# Patient Record
Sex: FEMALE | Race: WHITE | Employment: OTHER | URBAN - METROPOLITAN AREA
[De-identification: names, ages, dates, MRNs, and addresses within clinical notes are randomized per-mention and may not be internally consistent; named-entity substitution may affect disease eponyms.]

---

## 2019-06-03 ENCOUNTER — HOSPITAL ENCOUNTER (OUTPATIENT)
Age: 74
Discharge: HOME | End: 2019-06-05
Payer: MEDICARE

## 2019-06-03 PROCEDURE — 88175 CYTOPATH C/V AUTO FLUID REDO: CPT

## 2022-06-29 ENCOUNTER — APPOINTMENT (OUTPATIENT)
Dept: GENERAL RADIOLOGY | Age: 77
End: 2022-06-29
Payer: MEDICARE

## 2022-06-29 ENCOUNTER — HOSPITAL ENCOUNTER (EMERGENCY)
Age: 77
Discharge: HOME | End: 2022-06-29
Attending: EMERGENCY MEDICINE
Payer: MEDICARE

## 2022-06-29 VITALS
SYSTOLIC BLOOD PRESSURE: 138 MMHG | DIASTOLIC BLOOD PRESSURE: 78 MMHG | HEART RATE: 83 BPM | WEIGHT: 148 LBS | BODY MASS INDEX: 27.23 KG/M2 | HEIGHT: 62 IN | OXYGEN SATURATION: 96 % | TEMPERATURE: 97.6 F | RESPIRATION RATE: 14 BRPM

## 2022-06-29 DIAGNOSIS — U07.1 COVID-19: Primary | ICD-10-CM

## 2022-06-29 DIAGNOSIS — R06.02 SHORTNESS OF BREATH: ICD-10-CM

## 2022-06-29 LAB
ANION GAP SERPL CALCULATED.3IONS-SCNC: 14 MMOL/L (ref 7–16)
BASOPHILS ABSOLUTE: 0.02 E9/L (ref 0–0.2)
BASOPHILS RELATIVE PERCENT: 0.3 % (ref 0–2)
BUN BLDV-MCNC: 14 MG/DL (ref 6–23)
CALCIUM SERPL-MCNC: 9.5 MG/DL (ref 8.6–10.2)
CHLORIDE BLD-SCNC: 100 MMOL/L (ref 98–107)
CO2: 25 MMOL/L (ref 22–29)
CREAT SERPL-MCNC: 0.9 MG/DL (ref 0.5–1)
D DIMER: <200 NG/ML DDU
EKG ATRIAL RATE: 78 BPM
EKG P AXIS: 63 DEGREES
EKG P-R INTERVAL: 146 MS
EKG Q-T INTERVAL: 368 MS
EKG QRS DURATION: 82 MS
EKG QTC CALCULATION (BAZETT): 419 MS
EKG R AXIS: 0 DEGREES
EKG T AXIS: 59 DEGREES
EKG VENTRICULAR RATE: 78 BPM
EOSINOPHILS ABSOLUTE: 0.03 E9/L (ref 0.05–0.5)
EOSINOPHILS RELATIVE PERCENT: 0.5 % (ref 0–6)
GFR AFRICAN AMERICAN: >60
GFR NON-AFRICAN AMERICAN: >60 ML/MIN/1.73
GLUCOSE BLD-MCNC: 91 MG/DL (ref 74–99)
HCT VFR BLD CALC: 44.6 % (ref 34–48)
HEMOGLOBIN: 14.4 G/DL (ref 11.5–15.5)
IMMATURE GRANULOCYTES #: 0.02 E9/L
IMMATURE GRANULOCYTES %: 0.3 % (ref 0–5)
LYMPHOCYTES ABSOLUTE: 0.66 E9/L (ref 1.5–4)
LYMPHOCYTES RELATIVE PERCENT: 10.2 % (ref 20–42)
MCH RBC QN AUTO: 30.8 PG (ref 26–35)
MCHC RBC AUTO-ENTMCNC: 32.3 % (ref 32–34.5)
MCV RBC AUTO: 95.5 FL (ref 80–99.9)
MONOCYTES ABSOLUTE: 0.78 E9/L (ref 0.1–0.95)
MONOCYTES RELATIVE PERCENT: 12.1 % (ref 2–12)
NEUTROPHILS ABSOLUTE: 4.94 E9/L (ref 1.8–7.3)
NEUTROPHILS RELATIVE PERCENT: 76.6 % (ref 43–80)
PDW BLD-RTO: 13.3 FL (ref 11.5–15)
PLATELET # BLD: 286 E9/L (ref 130–450)
PMV BLD AUTO: 10.3 FL (ref 7–12)
POTASSIUM REFLEX MAGNESIUM: 4.1 MMOL/L (ref 3.5–5)
RBC # BLD: 4.67 E12/L (ref 3.5–5.5)
SODIUM BLD-SCNC: 139 MMOL/L (ref 132–146)
TROPONIN, HIGH SENSITIVITY: 7 NG/L (ref 0–9)
TROPONIN, HIGH SENSITIVITY: 8 NG/L (ref 0–9)
WBC # BLD: 6.5 E9/L (ref 4.5–11.5)

## 2022-06-29 PROCEDURE — 85025 COMPLETE CBC W/AUTO DIFF WBC: CPT

## 2022-06-29 PROCEDURE — 80048 BASIC METABOLIC PNL TOTAL CA: CPT

## 2022-06-29 PROCEDURE — 99285 EMERGENCY DEPT VISIT HI MDM: CPT

## 2022-06-29 PROCEDURE — 85378 FIBRIN DEGRADE SEMIQUANT: CPT

## 2022-06-29 PROCEDURE — 84484 ASSAY OF TROPONIN QUANT: CPT

## 2022-06-29 PROCEDURE — 71046 X-RAY EXAM CHEST 2 VIEWS: CPT

## 2022-06-29 PROCEDURE — 93005 ELECTROCARDIOGRAM TRACING: CPT | Performed by: NURSE PRACTITIONER

## 2022-06-29 NOTE — ED PROVIDER NOTES
ED Attending shared visit  CC: No         [facility]  Department of Emergency Medicine   ED  Encounter Note  Admit Date/RoomTime: 2022  2:13 PM  ED Room:     NAME: Marques Leo  : 1945  MRN: [id number]     Chief Complaint:  Positive For Covid-19 (today), Chest Pain (SOB and CP x3 days), and Shortness of Breath    History of Present Illness      Marques Leo is a 68 y.o. old female who presents to the emergency department by private vehicle with sudden onset of SOB with exertion which began 3 day(s) prior to arrival.   Her symptoms are associated with cough and chest pain, and denies abdominal pain, AICD firing, calf pain, dizziness, leg swelling, palpitations, rapid heart beat, slow heart beat or syncope. Since onset the symptoms have been remaining constant. The symptoms are aggravated by nothing and relieved by nothing. Patient states she tested positive for COVID-19 at home test today. .  ROS   Pertinent positives and negatives are stated within HPI, all other systems reviewed and are negative. Past Medical History:  has a past medical history of Hypertension and Varicose veins. Surgical History:  has a past surgical history that includes [place] tooth extraction; Colonoscopy; and other surgical history (Right, 5/15/15). Social History:  reports that she has never smoked. She has never used smokeless tobacco. She reports current alcohol use. She reports that she does not use drugs. Family History: family history includes Cancer in her maternal cousin; High Blood Pressure in her father and mother. Allergies: Patient has no known allergies. Physical Exam   Oxygen Saturation Interpretation: Normal on room air analysis.         ED Triage Vitals [22 1324]   BP Temp Temp Source Heart Rate Resp SpO2 Height Weight   (!) 148/104 97.6 °F (36.4 °C) Temporal 83 14 99 % 5' 2\" (1.575 m) 148 lb (67.1 kg)         · General Appearance/Constitutional: Alert,.  · HEENT:  NC/NT. PERRLA. Airway patent. · Neck:  Normal ROM. Supple. · Respiratoty:  Breath sounds: equal bilaterally. Lung sounds: normal.   · CV:  Regular rate and rhythm, normal heart sounds, without pathological murmurs, ectopy, gallops, or rubs. .  · GI:  Soft, nontender, good bowel sounds. No firm or pulsatile mass. · Integument:  Normal turgor. Warm, dry, without visible rash. · Extremities/Lymphatics:  Edema:  none Bilateral lower extremity(s). No cords or calf tenderness. No significant calf/ankle edema. .  · Neurological:  Oriented x3. Motor functions intact.     Lab / Imaging Results   (All laboratory and radiology results have been personally reviewed by myself)  Labs:  Results for orders placed or performed during the hospital encounter of 06/29/22   CBC with Auto Differential   Result Value Ref Range    WBC 6.5 4.5 - 11.5 E9/L    RBC 4.67 3.50 - 5.50 E12/L    Hemoglobin 14.4 11.5 - 15.5 g/dL    Hematocrit 44.6 34.0 - 48.0 %    MCV 95.5 80.0 - 99.9 fL    MCH 30.8 26.0 - 35.0 pg    MCHC 32.3 32.0 - 34.5 %    RDW 13.3 11.5 - 15.0 fL    Platelets 614 613 - 097 E9/L    MPV 10.3 7.0 - 12.0 fL    Neutrophils % 76.6 43.0 - 80.0 %    Immature Granulocytes % 0.3 0.0 - 5.0 %    Lymphocytes % 10.2 (L) 20.0 - 42.0 %    Monocytes % 12.1 (H) 2.0 - 12.0 %    Eosinophils % 0.5 0.0 - 6.0 %    Basophils % 0.3 0.0 - 2.0 %    Neutrophils Absolute 4.94 1.80 - 7.30 E9/L    Immature Granulocytes # 0.02 E9/L    Lymphocytes Absolute 0.66 (L) 1.50 - 4.00 E9/L    Monocytes Absolute 0.78 0.10 - 0.95 E9/L    Eosinophils Absolute 0.03 (L) 0.05 - 0.50 E9/L    Basophils Absolute 0.02 0.00 - 0.20 J3/P   Basic Metabolic Panel w/ Reflex to MG   Result Value Ref Range    Sodium 139 132 - 146 mmol/L    Potassium reflex Magnesium 4.1 3.5 - 5.0 mmol/L    Chloride 100 98 - 107 mmol/L    CO2 25 22 - 29 mmol/L    Anion Gap 14 7 - 16 mmol/L    Glucose 91 74 - 99 mg/dL    BUN 14 6 - 23 mg/dL    CREATININE 0.9 0.5 - 1.0 mg/dL GFR Non-African American >60 >=60 mL/min/1.73    GFR African American >60     Calcium 9.5 8.6 - 10.2 mg/dL   Troponin   Result Value Ref Range    Troponin, High Sensitivity 8 0 - 9 ng/L   D-Dimer, Quantitative   Result Value Ref Range    D-Dimer, Quant <200 ng/mL DDU   Troponin   Result Value Ref Range    Troponin, High Sensitivity 7 0 - 9 ng/L   EKG 12 Lead   Result Value Ref Range    Ventricular Rate 78 BPM    Atrial Rate 78 BPM    P-R Interval 146 ms    QRS Duration 82 ms    Q-T Interval 368 ms    QTc Calculation (Bazett) 419 ms    P Axis 63 degrees    R Axis 0 degrees    T Axis 59 degrees     Imaging: All Radiology results interpreted by Radiologist unless otherwise noted. XR CHEST (2 VW)   Final Result   No acute process. EKG #1:  Interpreted by emergency department attending physician unless otherwise noted. 22  Time: 1323  Rate: 78  Rhythm: Sinus  Interpretation: Normal sinus rhythm with ST and T wave abnormality. No STEMI. Comparison: no previous EKG available           HISTORY:0  EC  AGE:2  RISK FACTORS:1  TROPONIN: 0    TOTAL SCORE: 3    ED Course / Medical Decision Making   Medications - No data to display     Re-Evaluations:  22      Time: 1546-RN ambulated patient and she remained at 96% no distress noted. Patients condition is improving. Consultations:             None    Procedures:   none    MDM:  Patient presents to the ED for shortness of breath and chest pain after testing positive for COVID 19. Differential diagnoses included but not limited to cardiac versus PE versus COVID 19 versus hypoxia. Workup in the ED revealed patient's vital signs are stable patient not hypoxic or tachycardic. She ambulated with a steady gait without being hypoxic. High-sensitivity delta delta troponins were 8 and 7. CBC and BMP was unremarkable. D-dimer was less than 200 EKG was normal sinus rhythm with no STEMI. Heart score is a 3 based on age and risk factors.   Chest x-ray reveals no acute processes. She will be discharged home with a pulse ox to monitor at home. Lung sounds are clear. Patient continues to be non-toxic on re-evaluation. Findings were discussed with the patient and reasons to immediately return to the ED were articulated to them. They will follow-up with their PMD.      Plan of Care/Counseling:  MIGUEL Grimm CNP and EM Attending Physician reviewed today's visit with the patient in addition to providing specific details for the plan of care and counseling regarding the diagnosis and prognosis. Questions are answered at this time and are agreeable with the plan. Assessment      1. COVID-19    2. Shortness of breath      This patient's ED course included: a personal history and physicial examination, re-evaluation prior to disposition, multiple bedside re-evaluations, IV medications and cardiac monitoring  This patient has remained hemodynamically stable during their ED course. Plan   Discharged home. Patient condition is stable. New Medications     Discharge Medication List as of 6/29/2022  4:44 PM        Electronically signed by MIGUEL Grimm CNP   DD: 6/29/22  **This report was transcribed using voice recognition software. Every effort was made to ensure accuracy; however, inadvertent computerized transcription errors may be present.   END OF PROVIDER NOTE        MIGUEL Grimm CNP  06/29/22 [id number]

## 2022-06-29 NOTE — ED NOTES
Department of Emergency Medicine  FIRST PROVIDER TRIAGE NOTE             Independent NATHAN           6/29/22  1:25 PM EDT    Date of Encounter: 6/29/22   MRN: [id number]      HPI: James Medrano is a 68 y.o. female who presents to the ED for Positive For Covid-19 (today), Chest Pain (SOB and CP x3 days), and Shortness of Breath     Left-sided chest pain that goes into her back for the last 3 days. Along with shortness of breath. She tested at home test for COVID-19 was positive today. Vitals:    06/29/22 1324   BP: (!) 148/104   Pulse: 83   Resp: 14   Temp: 97.6 °F (36.4 °C)   SpO2: 99%       ROS: Negative for abd pain or fever. PE: Gen Appearance/Constitutional: alert  CV: regular rate     Initial Plan of Care: All treatment areas with department are currently occupied. Plan to order/Initiate the following while awaiting opening in ED: labs, EKG and imaging studies.   Initiate Treatment-Testing, Proceed toTreatment Area When Bed Available for ED Attending/NATHAN to Continue Care    Electronically signed by MIGUEL Cosby CNP   DD: 6/29/22         MIGUEL Cosby CNP  06/29/22 [id number]

## 2022-06-29 NOTE — ED NOTES
Ambulated patient approx 150 ft while pt maintained 96% on room air.       Ginny Hernandez RN  06/29/22 [id number]

## 2022-06-30 ENCOUNTER — CARE COORDINATION (OUTPATIENT)
Dept: CARE COORDINATION | Age: 77
End: 2022-06-30

## 2022-06-30 NOTE — CARE COORDINATION
Patient contacted regarding COVID-19 diagnosis and pulse oximeter ordered at discharge. Discussed COVID-19 related testing which was available at this time. Test results were positive. Patient informed of results, if available? Yes. Ambulatory Care Manager contacted the patient by telephone to perform post discharge assessment. Call within 2 business days of discharge: Yes. Verified name and  with patient as identifiers. Provided introduction to self, and explanation of the CTN/ACM role, and reason for call due to risk factors for infection and/or exposure to COVID-19. Symptoms reviewed with patient who verbalized the following symptoms: cough  shortness of breath  no new symptoms  no worsening symptoms. Due to no new or worsening symptoms encounter was not routed to provider for escalation. Discussed follow-up appointments. If no appointment was previously scheduled, appointment scheduling offered: Yes. [facility] follow up appointment(s): No future appointments. SPOKE WITH OFFICE, NO NEED TO SEE PT AT THIS TIME PER OFFICE  [facility] follow up appointment(s)NA    Non-face-to-face services provided:  Obtained and reviewed discharge summary and/or continuity of care documents     Advance Care Planning:   Does patient have an Advance Directive:  reviewed and current. Educated patient about risk for severe COVID-19 due to risk factors according to CDC guidelines. ACM reviewed discharge instructions, medical action plan and red flag symptoms with the patient who verbalized understanding. Discussed COVID vaccination status: Yes. Education provided on COVID-19 vaccination as appropriate. Discussed exposure protocols and quarantine with CDC Guidelines. Patient was given an opportunity to verbalize any questions and concerns and agrees to contact ACM or health care provider for questions related to their healthcare.     Reviewed and educated patient on any new and changed medications related to discharge diagnosis     Was patient discharged with a pulse oximeter? yes, discussed and confirmed pulse oximeter discharge instructions and when to notify provider or seek emergency care. ACM provided contact information. No further follow-up call identified based on severity of symptoms and risk factors. Patient understands CDC guidelines and is able to repeat them. Patient verbalizes understanding of suggestions for follow up from ED AVS and has number to do so  Patient has no new or worsening symptoms. Patient wishes [facility]. Patient has contact information and encouraged to contact ACM should there be any questions or concerns. Episode to be closed at this time.    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

## 2025-03-06 ENCOUNTER — TELEPHONE (OUTPATIENT)
Dept: VASCULAR SURGERY | Age: 80
End: 2025-03-06

## 2025-03-06 NOTE — TELEPHONE ENCOUNTER
Received referral from [facility] for Dr. Hamm regarding varicose veins, left message for patient to return call to schedule.

## 2025-04-01 ENCOUNTER — OFFICE VISIT (OUTPATIENT)
Dept: VASCULAR SURGERY | Age: 80
End: 2025-04-01

## 2025-04-01 DIAGNOSIS — M79.605 LEFT LEG PAIN: Primary | ICD-10-CM

## 2025-04-01 RX ORDER — MONTELUKAST SODIUM 10 MG/1
10 TABLET ORAL NIGHTLY
COMMUNITY

## 2025-04-01 RX ORDER — LEVOTHYROXINE SODIUM 25 UG/1
25 TABLET ORAL DAILY
COMMUNITY

## 2025-04-01 NOTE — PROGRESS NOTES
Vascular Surgery Outpatient Consultation      Chief Complaint   Patient presents with    Surgical Consult     VV       Reason for Consult:  Left leg pain    Requesting Physician:  Dr. Wan    HISTORY OF PRESENT ILLNESS:                The patient is a 79 y.o. female who is referred for evaluation of left leg pain.  Pt is known to us from 2015 when she had right leg stab phlebectomies done.  Since that time her varicose veins have returned but they do not bother her.  Pt states she is very active and on her feet all day.  She does have known issues with her back and sciatica.  About 3 weeks ago she developed a sharp pain that started in the left foot and traveled up to the thigh.  This lasted all day. When she woke up the next day the pain was gone.  She has not had the pain since.  She is traveling soon and wants to make sure there is not an issue with her leg.     Past Medical History:        Diagnosis Date    Acute H. pylori gastric ulcer     Hypertension     Varicose veins      Past Surgical History:        Procedure Laterality Date    COLONOSCOPY      OTHER SURGICAL HISTORY Right 5/15/15    lower extremity stab phlebectomies    WISDOM TOOTH EXTRACTION       Current Medications:   Prior to Admission medications    Medication Sig Start Date End Date Taking? Authorizing Provider   levothyroxine (SYNTHROID) 25 MCG tablet Take 1 tablet by mouth daily   Yes Demarcus Griffith MD   AMLODIPINE BESYLATE PO    Yes Demarcus Griffith MD   montelukast (SINGULAIR) 10 MG tablet Take 1 tablet by mouth nightly   Yes Demarcus Griffith MD   hydrochlorothiazide (MICROZIDE) 12.5 MG capsule  1/9/17  Yes Demarcus Griffith MD   venlafaxine (EFFEXOR XR) 37.5 MG extended release capsule  3/17/17  Yes Demarcus Griffith MD   Naproxen Sodium (ALEVE PO) Take  by mouth.  Patient not taking: Reported on 4/1/2025    Demarcus Griffith MD     Allergies:  Patient has no known allergies.    Social History     Socioeconomic

## 2025-07-13 ENCOUNTER — HOSPITAL ENCOUNTER (INPATIENT)
Age: 80
LOS: 4 days | Discharge: HOME HEALTH | DRG: 871 | End: 2025-07-17
Attending: STUDENT IN AN ORGANIZED HEALTH CARE EDUCATION/TRAINING PROGRAM | Admitting: STUDENT IN AN ORGANIZED HEALTH CARE EDUCATION/TRAINING PROGRAM
Payer: MEDICARE

## 2025-07-13 ENCOUNTER — APPOINTMENT (OUTPATIENT)
Dept: CT IMAGING | Age: 80
DRG: 871 | End: 2025-07-13
Attending: STUDENT IN AN ORGANIZED HEALTH CARE EDUCATION/TRAINING PROGRAM
Payer: MEDICARE

## 2025-07-13 ENCOUNTER — APPOINTMENT (OUTPATIENT)
Dept: ULTRASOUND IMAGING | Age: 80
DRG: 871 | End: 2025-07-13
Payer: MEDICARE

## 2025-07-13 DIAGNOSIS — R42 DIZZINESS: ICD-10-CM

## 2025-07-13 DIAGNOSIS — J18.9 PNEUMONIA OF RIGHT LOWER LOBE DUE TO INFECTIOUS ORGANISM: ICD-10-CM

## 2025-07-13 DIAGNOSIS — E87.1 HYPONATREMIA: Primary | ICD-10-CM

## 2025-07-13 DIAGNOSIS — I26.93 SINGLE SUBSEGMENTAL PULMONARY EMBOLISM WITHOUT ACUTE COR PULMONALE (HCC): ICD-10-CM

## 2025-07-13 LAB
ALBUMIN SERPL-MCNC: 3.4 G/DL (ref 3.5–5.2)
ALP SERPL-CCNC: 85 U/L (ref 35–104)
ALT SERPL-CCNC: 89 U/L (ref 0–35)
ANION GAP SERPL CALCULATED.3IONS-SCNC: 12 MMOL/L (ref 7–16)
ANION GAP SERPL CALCULATED.3IONS-SCNC: 17 MMOL/L (ref 7–16)
AST SERPL-CCNC: 89 U/L (ref 0–35)
B PARAP IS1001 DNA NPH QL NAA+NON-PROBE: NOT DETECTED
B PERT DNA SPEC QL NAA+PROBE: NOT DETECTED
BACTERIA URNS QL MICRO: ABNORMAL
BASOPHILS # BLD: 0.13 K/UL (ref 0–0.2)
BASOPHILS NFR BLD: 1 % (ref 0–2)
BILIRUB SERPL-MCNC: 0.3 MG/DL (ref 0–1.2)
BILIRUB UR QL STRIP: ABNORMAL
BUN SERPL-MCNC: 15 MG/DL (ref 8–23)
BUN SERPL-MCNC: 16 MG/DL (ref 8–23)
C PNEUM DNA NPH QL NAA+NON-PROBE: NOT DETECTED
CALCIUM SERPL-MCNC: 8.5 MG/DL (ref 8.8–10.2)
CALCIUM SERPL-MCNC: 8.6 MG/DL (ref 8.8–10.2)
CHLORIDE SERPL-SCNC: 88 MMOL/L (ref 98–107)
CHLORIDE SERPL-SCNC: 89 MMOL/L (ref 98–107)
CHOLEST SERPL-MCNC: 143 MG/DL
CLARITY UR: ABNORMAL
CO2 SERPL-SCNC: 20 MMOL/L (ref 22–29)
CO2 SERPL-SCNC: 25 MMOL/L (ref 22–29)
COLOR UR: YELLOW
CREAT SERPL-MCNC: 0.9 MG/DL (ref 0.5–1)
CREAT SERPL-MCNC: 1.1 MG/DL (ref 0.5–1)
CREAT UR-MCNC: 288 MG/DL (ref 29–226)
CRP SERPL HS-MCNC: 325 MG/L (ref 0–5)
EOSINOPHIL # BLD: 0 K/UL (ref 0.05–0.5)
EOSINOPHILS RELATIVE PERCENT: 0 % (ref 0–6)
EPI CELLS #/AREA URNS HPF: ABNORMAL /HPF
ERYTHROCYTE [DISTWIDTH] IN BLOOD BY AUTOMATED COUNT: 12.9 % (ref 11.5–15)
FLUAV RNA NPH QL NAA+NON-PROBE: NOT DETECTED
FLUBV RNA NPH QL NAA+NON-PROBE: NOT DETECTED
GFR, ESTIMATED: 53 ML/MIN/1.73M2
GFR, ESTIMATED: 64 ML/MIN/1.73M2
GLUCOSE SERPL-MCNC: 101 MG/DL (ref 74–99)
GLUCOSE SERPL-MCNC: 90 MG/DL (ref 74–99)
GLUCOSE UR STRIP-MCNC: NEGATIVE MG/DL
HADV DNA NPH QL NAA+NON-PROBE: NOT DETECTED
HCOV 229E RNA NPH QL NAA+NON-PROBE: NOT DETECTED
HCOV HKU1 RNA NPH QL NAA+NON-PROBE: NOT DETECTED
HCOV NL63 RNA NPH QL NAA+NON-PROBE: NOT DETECTED
HCOV OC43 RNA NPH QL NAA+NON-PROBE: NOT DETECTED
HCT VFR BLD AUTO: 36.3 % (ref 34–48)
HDLC SERPL-MCNC: 44 MG/DL
HGB BLD-MCNC: 12.2 G/DL (ref 11.5–15.5)
HGB UR QL STRIP.AUTO: ABNORMAL
HMPV RNA NPH QL NAA+NON-PROBE: NOT DETECTED
HPIV1 RNA NPH QL NAA+NON-PROBE: NOT DETECTED
HPIV2 RNA NPH QL NAA+NON-PROBE: NOT DETECTED
HPIV3 RNA NPH QL NAA+NON-PROBE: NOT DETECTED
HPIV4 RNA NPH QL NAA+NON-PROBE: NOT DETECTED
KETONES UR STRIP-MCNC: ABNORMAL MG/DL
LDLC SERPL CALC-MCNC: 80 MG/DL
LEUKOCYTE ESTERASE UR QL STRIP: ABNORMAL
LYMPHOCYTES NFR BLD: 0.53 K/UL (ref 1.5–4)
LYMPHOCYTES RELATIVE PERCENT: 4 % (ref 20–42)
M PNEUMO DNA NPH QL NAA+NON-PROBE: NOT DETECTED
MAGNESIUM SERPL-MCNC: 2.2 MG/DL (ref 1.6–2.4)
MCH RBC QN AUTO: 30.2 PG (ref 26–35)
MCHC RBC AUTO-ENTMCNC: 33.6 G/DL (ref 32–34.5)
MCV RBC AUTO: 89.9 FL (ref 80–99.9)
MONOCYTES NFR BLD: 16 % (ref 2–12)
MONOCYTES NFR BLD: 2.38 K/UL (ref 0.1–0.95)
NEUTROPHILS NFR BLD: 80 % (ref 43–80)
NEUTS SEG NFR BLD: 12.16 K/UL (ref 1.8–7.3)
NITRITE UR QL STRIP: NEGATIVE
OSMOLALITY UR: 548 MOSM/KG (ref 300–900)
PH UR STRIP: 6 [PH] (ref 5–8)
PLATELET # BLD AUTO: 279 K/UL (ref 130–450)
PMV BLD AUTO: 10.9 FL (ref 7–12)
POTASSIUM SERPL-SCNC: 4.1 MMOL/L (ref 3.5–5.1)
POTASSIUM SERPL-SCNC: 4.1 MMOL/L (ref 3.5–5.1)
PROCALCITONIN SERPL-MCNC: 1.47 NG/ML (ref 0–0.08)
PROT SERPL-MCNC: 6.8 G/DL (ref 6.4–8.3)
PROT UR STRIP-MCNC: 100 MG/DL
RBC # BLD AUTO: 4.04 M/UL (ref 3.5–5.5)
RBC # BLD: ABNORMAL 10*6/UL
RBC # BLD: ABNORMAL 10*6/UL
RBC #/AREA URNS HPF: ABNORMAL /HPF
RSV RNA NPH QL NAA+NON-PROBE: NOT DETECTED
RV+EV RNA NPH QL NAA+NON-PROBE: NOT DETECTED
SARS-COV-2 RNA NPH QL NAA+NON-PROBE: NOT DETECTED
SODIUM SERPL-SCNC: 125 MMOL/L (ref 136–145)
SODIUM SERPL-SCNC: 126 MMOL/L (ref 136–145)
SODIUM UR-SCNC: <20 MMOL/L
SP GR UR STRIP: 1.02 (ref 1–1.03)
SPECIMEN DESCRIPTION: NORMAL
T4 FREE SERPL-MCNC: 1 NG/DL (ref 0.9–1.7)
TOTAL PROTEIN, URINE: 145 MG/DL (ref 0–12)
TRIGL SERPL-MCNC: 99 MG/DL
TROPONIN I SERPL HS-MCNC: 17 NG/L (ref 0–14)
TSH SERPL DL<=0.05 MIU/L-ACNC: 1.22 UIU/ML (ref 0.27–4.2)
URATE SERPL-MCNC: 5.1 MG/DL (ref 2.4–5.7)
URINE TOTAL PROTEIN CREATININE RATIO: 0.5 (ref 0–0.2)
UROBILINOGEN UR STRIP-ACNC: 1 EU/DL (ref 0–1)
UUN UR-MCNC: 887 MG/DL (ref 800–1666)
VLDLC SERPL CALC-MCNC: 20 MG/DL
WBC #/AREA URNS HPF: ABNORMAL /HPF
WBC OTHER # BLD: 15.2 K/UL (ref 4.5–11.5)

## 2025-07-13 PROCEDURE — 2580000003 HC RX 258: Performed by: STUDENT IN AN ORGANIZED HEALTH CARE EDUCATION/TRAINING PROGRAM

## 2025-07-13 PROCEDURE — 80061 LIPID PANEL: CPT

## 2025-07-13 PROCEDURE — 83935 ASSAY OF URINE OSMOLALITY: CPT

## 2025-07-13 PROCEDURE — 93970 EXTREMITY STUDY: CPT

## 2025-07-13 PROCEDURE — 84145 PROCALCITONIN (PCT): CPT

## 2025-07-13 PROCEDURE — 84443 ASSAY THYROID STIM HORMONE: CPT

## 2025-07-13 PROCEDURE — 85025 COMPLETE CBC W/AUTO DIFF WBC: CPT

## 2025-07-13 PROCEDURE — 80048 BASIC METABOLIC PNL TOTAL CA: CPT

## 2025-07-13 PROCEDURE — 87086 URINE CULTURE/COLONY COUNT: CPT

## 2025-07-13 PROCEDURE — 2500000003 HC RX 250 WO HCPCS: Performed by: STUDENT IN AN ORGANIZED HEALTH CARE EDUCATION/TRAINING PROGRAM

## 2025-07-13 PROCEDURE — 84300 ASSAY OF URINE SODIUM: CPT

## 2025-07-13 PROCEDURE — 80053 COMPREHEN METABOLIC PANEL: CPT

## 2025-07-13 PROCEDURE — 0202U NFCT DS 22 TRGT SARS-COV-2: CPT

## 2025-07-13 PROCEDURE — 87899 AGENT NOS ASSAY W/OPTIC: CPT

## 2025-07-13 PROCEDURE — 72125 CT NECK SPINE W/O DYE: CPT

## 2025-07-13 PROCEDURE — 96374 THER/PROPH/DIAG INJ IV PUSH: CPT

## 2025-07-13 PROCEDURE — 87449 NOS EACH ORGANISM AG IA: CPT

## 2025-07-13 PROCEDURE — 99285 EMERGENCY DEPT VISIT HI MDM: CPT

## 2025-07-13 PROCEDURE — 71275 CT ANGIOGRAPHY CHEST: CPT

## 2025-07-13 PROCEDURE — 87081 CULTURE SCREEN ONLY: CPT

## 2025-07-13 PROCEDURE — 86140 C-REACTIVE PROTEIN: CPT

## 2025-07-13 PROCEDURE — 99222 1ST HOSP IP/OBS MODERATE 55: CPT | Performed by: STUDENT IN AN ORGANIZED HEALTH CARE EDUCATION/TRAINING PROGRAM

## 2025-07-13 PROCEDURE — 6360000004 HC RX CONTRAST MEDICATION: Performed by: SPECIALIST

## 2025-07-13 PROCEDURE — 70450 CT HEAD/BRAIN W/O DYE: CPT

## 2025-07-13 PROCEDURE — 84550 ASSAY OF BLOOD/URIC ACID: CPT

## 2025-07-13 PROCEDURE — 84439 ASSAY OF FREE THYROXINE: CPT

## 2025-07-13 PROCEDURE — 84484 ASSAY OF TROPONIN QUANT: CPT

## 2025-07-13 PROCEDURE — 93005 ELECTROCARDIOGRAM TRACING: CPT | Performed by: STUDENT IN AN ORGANIZED HEALTH CARE EDUCATION/TRAINING PROGRAM

## 2025-07-13 PROCEDURE — 2140000000 HC CCU INTERMEDIATE R&B

## 2025-07-13 PROCEDURE — 84540 ASSAY OF URINE/UREA-N: CPT

## 2025-07-13 PROCEDURE — 82570 ASSAY OF URINE CREATININE: CPT

## 2025-07-13 PROCEDURE — 6360000002 HC RX W HCPCS: Performed by: STUDENT IN AN ORGANIZED HEALTH CARE EDUCATION/TRAINING PROGRAM

## 2025-07-13 PROCEDURE — 81001 URINALYSIS AUTO W/SCOPE: CPT

## 2025-07-13 PROCEDURE — 84156 ASSAY OF PROTEIN URINE: CPT

## 2025-07-13 PROCEDURE — 83735 ASSAY OF MAGNESIUM: CPT

## 2025-07-13 RX ORDER — VENLAFAXINE HYDROCHLORIDE 75 MG/1
75 CAPSULE, EXTENDED RELEASE ORAL DAILY
Status: DISCONTINUED | OUTPATIENT
Start: 2025-07-14 | End: 2025-07-17 | Stop reason: HOSPADM

## 2025-07-13 RX ORDER — SODIUM CHLORIDE 0.9 % (FLUSH) 0.9 %
5-40 SYRINGE (ML) INJECTION EVERY 12 HOURS SCHEDULED
Status: DISCONTINUED | OUTPATIENT
Start: 2025-07-13 | End: 2025-07-17 | Stop reason: HOSPADM

## 2025-07-13 RX ORDER — ONDANSETRON 2 MG/ML
4 INJECTION INTRAMUSCULAR; INTRAVENOUS EVERY 6 HOURS PRN
Status: DISCONTINUED | OUTPATIENT
Start: 2025-07-13 | End: 2025-07-17 | Stop reason: HOSPADM

## 2025-07-13 RX ORDER — SODIUM CHLORIDE 0.9 % (FLUSH) 0.9 %
5-40 SYRINGE (ML) INJECTION PRN
Status: DISCONTINUED | OUTPATIENT
Start: 2025-07-13 | End: 2025-07-17 | Stop reason: HOSPADM

## 2025-07-13 RX ORDER — SODIUM CHLORIDE 9 MG/ML
INJECTION, SOLUTION INTRAVENOUS CONTINUOUS
Status: ACTIVE | OUTPATIENT
Start: 2025-07-13 | End: 2025-07-14

## 2025-07-13 RX ORDER — SODIUM CHLORIDE 9 MG/ML
INJECTION, SOLUTION INTRAVENOUS PRN
Status: DISCONTINUED | OUTPATIENT
Start: 2025-07-13 | End: 2025-07-17 | Stop reason: HOSPADM

## 2025-07-13 RX ORDER — VENLAFAXINE 75 MG/1
75 TABLET ORAL DAILY
COMMUNITY

## 2025-07-13 RX ORDER — ACETAMINOPHEN 650 MG/1
650 SUPPOSITORY RECTAL EVERY 6 HOURS PRN
Status: DISCONTINUED | OUTPATIENT
Start: 2025-07-13 | End: 2025-07-17 | Stop reason: HOSPADM

## 2025-07-13 RX ORDER — ONDANSETRON 4 MG/1
4 TABLET, ORALLY DISINTEGRATING ORAL EVERY 8 HOURS PRN
Status: DISCONTINUED | OUTPATIENT
Start: 2025-07-13 | End: 2025-07-17 | Stop reason: HOSPADM

## 2025-07-13 RX ORDER — ENOXAPARIN SODIUM 100 MG/ML
40 INJECTION SUBCUTANEOUS DAILY
Status: DISCONTINUED | OUTPATIENT
Start: 2025-07-13 | End: 2025-07-13

## 2025-07-13 RX ORDER — VENLAFAXINE HYDROCHLORIDE 37.5 MG/1
37.5 CAPSULE, EXTENDED RELEASE ORAL
Status: DISCONTINUED | OUTPATIENT
Start: 2025-07-14 | End: 2025-07-14

## 2025-07-13 RX ORDER — IOPAMIDOL 755 MG/ML
75 INJECTION, SOLUTION INTRAVASCULAR
Status: COMPLETED | OUTPATIENT
Start: 2025-07-13 | End: 2025-07-13

## 2025-07-13 RX ORDER — LEVOTHYROXINE SODIUM 25 UG/1
25 TABLET ORAL
Status: DISCONTINUED | OUTPATIENT
Start: 2025-07-14 | End: 2025-07-17 | Stop reason: HOSPADM

## 2025-07-13 RX ORDER — 0.9 % SODIUM CHLORIDE 0.9 %
1000 INTRAVENOUS SOLUTION INTRAVENOUS ONCE
Status: COMPLETED | OUTPATIENT
Start: 2025-07-13 | End: 2025-07-13

## 2025-07-13 RX ORDER — ENOXAPARIN SODIUM 100 MG/ML
1 INJECTION SUBCUTANEOUS 2 TIMES DAILY
Status: DISCONTINUED | OUTPATIENT
Start: 2025-07-13 | End: 2025-07-17

## 2025-07-13 RX ORDER — POLYETHYLENE GLYCOL 3350 17 G/17G
17 POWDER, FOR SOLUTION ORAL DAILY PRN
Status: DISCONTINUED | OUTPATIENT
Start: 2025-07-13 | End: 2025-07-17 | Stop reason: HOSPADM

## 2025-07-13 RX ORDER — ACETAMINOPHEN 325 MG/1
650 TABLET ORAL EVERY 6 HOURS PRN
Status: DISCONTINUED | OUTPATIENT
Start: 2025-07-13 | End: 2025-07-17 | Stop reason: HOSPADM

## 2025-07-13 RX ADMIN — SODIUM CHLORIDE 1000 ML: 9 INJECTION, SOLUTION INTRAVENOUS at 12:15

## 2025-07-13 RX ADMIN — DOXYCYCLINE 100 MG: 100 INJECTION, POWDER, LYOPHILIZED, FOR SOLUTION INTRAVENOUS at 16:53

## 2025-07-13 RX ADMIN — IOPAMIDOL 75 ML: 755 INJECTION, SOLUTION INTRAVENOUS at 13:39

## 2025-07-13 RX ADMIN — SODIUM CHLORIDE: 9 INJECTION, SOLUTION INTRAVENOUS at 22:08

## 2025-07-13 RX ADMIN — SODIUM CHLORIDE, PRESERVATIVE FREE 10 ML: 5 INJECTION INTRAVENOUS at 22:08

## 2025-07-13 RX ADMIN — WATER 1000 MG: 1 INJECTION INTRAMUSCULAR; INTRAVENOUS; SUBCUTANEOUS at 14:22

## 2025-07-13 ASSESSMENT — PAIN - FUNCTIONAL ASSESSMENT: PAIN_FUNCTIONAL_ASSESSMENT: NONE - DENIES PAIN

## 2025-07-13 NOTE — ED PROVIDER NOTES
RADIOLOGY:  Vascular duplex lower extremity venous bilateral   Final Result   No evidence of DVT in either lower extremity.         CT HEAD WO CONTRAST   Final Result      CTA PULMONARY W CONTRAST   Final Result   1. Small questionable embolus at the bifurcation of the anterior segmental artery of the left upper lobe. No right heart strain.   2. Pulmonary infiltrate in the right lower lobe, likely representing pneumonia.   3. Small right pleural effusion.            CT CERVICAL SPINE WO CONTRAST   Final Result   1. No fracture or acute osseous abnormality.   2. Cervical spine multilevel degenerative changes.                 ------------------------- NURSING NOTES AND VITALS REVIEWED ---------------------------  Date / Time Roomed:  7/13/2025 11:23 AM  ED Bed Assignment:  6420/6420-A    The nursing notes within the ED encounter and vital signs as below have been reviewed.     Patient Vitals for the past 24 hrs:   BP Temp Temp src Pulse Resp SpO2   07/14/25 1145 113/70 98.1 °F (36.7 °C) Oral 83 30 93 %   07/14/25 1100 126/70 98.6 °F (37 °C) Oral 80 23 94 %   07/14/25 0745 121/69 97.9 °F (36.6 °C) Oral 72 21 94 %   07/14/25 0500 101/69 -- -- 66 22 97 %   07/14/25 0400 96/61 -- -- 66 23 95 %   07/14/25 0330 108/66 -- -- 66 21 94 %   07/14/25 0215 -- 98.6 °F (37 °C) Oral 74 26 97 %   07/14/25 0200 108/67 -- -- 73 30 --   07/14/25 0130 113/69 -- -- 80 28 --   07/14/25 0001 (!) 143/87 (!) 103.1 °F (39.5 °C) Oral 98 28 91 %   07/13/25 2245 (!) 130/102 (!) 101.1 °F (38.4 °C) Temporal (!) 106 -- 97 %       Oxygen Saturation Interpretation: Normal    ------------------------------------------ PROGRESS NOTES ------------------------------------------  Re-evaluation(s):   Patient’s symptoms show no change  Repeat physical examination is not changed    Counseling:  I have spoken with the patient and discussed today’s results, in addition to providing specific details for the plan of care and counseling regarding the
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
## 2025-07-14 ENCOUNTER — APPOINTMENT (OUTPATIENT)
Age: 80
DRG: 871 | End: 2025-07-14
Attending: STUDENT IN AN ORGANIZED HEALTH CARE EDUCATION/TRAINING PROGRAM
Payer: MEDICARE

## 2025-07-14 LAB
ANION GAP SERPL CALCULATED.3IONS-SCNC: 12 MMOL/L (ref 7–16)
ANION GAP SERPL CALCULATED.3IONS-SCNC: 15 MMOL/L (ref 7–16)
ANION GAP SERPL CALCULATED.3IONS-SCNC: 17 MMOL/L (ref 7–16)
BUN SERPL-MCNC: 14 MG/DL (ref 8–23)
BUN SERPL-MCNC: 15 MG/DL (ref 8–23)
BUN SERPL-MCNC: 15 MG/DL (ref 8–23)
CALCIUM SERPL-MCNC: 8.1 MG/DL (ref 8.8–10.2)
CALCIUM SERPL-MCNC: 8.4 MG/DL (ref 8.8–10.2)
CALCIUM SERPL-MCNC: 8.6 MG/DL (ref 8.8–10.2)
CHLORIDE SERPL-SCNC: 93 MMOL/L (ref 98–107)
CHLORIDE SERPL-SCNC: 95 MMOL/L (ref 98–107)
CHLORIDE SERPL-SCNC: 96 MMOL/L (ref 98–107)
CO2 SERPL-SCNC: 19 MMOL/L (ref 22–29)
CO2 SERPL-SCNC: 20 MMOL/L (ref 22–29)
CO2 SERPL-SCNC: 21 MMOL/L (ref 22–29)
CREAT SERPL-MCNC: 0.8 MG/DL (ref 0.5–1)
EKG ATRIAL RATE: 87 BPM
EKG P AXIS: 44 DEGREES
EKG P-R INTERVAL: 138 MS
EKG Q-T INTERVAL: 342 MS
EKG QRS DURATION: 74 MS
EKG QTC CALCULATION (BAZETT): 411 MS
EKG R AXIS: -34 DEGREES
EKG T AXIS: 43 DEGREES
EKG VENTRICULAR RATE: 87 BPM
GFR, ESTIMATED: 75 ML/MIN/1.73M2
GFR, ESTIMATED: 76 ML/MIN/1.73M2
GFR, ESTIMATED: 79 ML/MIN/1.73M2
GLUCOSE SERPL-MCNC: 109 MG/DL (ref 74–99)
GLUCOSE SERPL-MCNC: 82 MG/DL (ref 74–99)
GLUCOSE SERPL-MCNC: 98 MG/DL (ref 74–99)
L PNEUMO1 AG UR QL IA.RAPID: NEGATIVE
OSMOLALITY SERPL: 280 MOSM/KG (ref 285–310)
POTASSIUM SERPL-SCNC: 3.9 MMOL/L (ref 3.5–5.1)
POTASSIUM SERPL-SCNC: 3.9 MMOL/L (ref 3.5–5.1)
POTASSIUM SERPL-SCNC: 4.5 MMOL/L (ref 3.5–5.1)
S PNEUM AG SPEC QL: NEGATIVE
SODIUM SERPL-SCNC: 128 MMOL/L (ref 136–145)
SODIUM SERPL-SCNC: 129 MMOL/L (ref 136–145)
SODIUM SERPL-SCNC: 131 MMOL/L (ref 136–145)
SPECIMEN SOURCE: NORMAL

## 2025-07-14 PROCEDURE — 80048 BASIC METABOLIC PNL TOTAL CA: CPT

## 2025-07-14 PROCEDURE — 2580000003 HC RX 258: Performed by: STUDENT IN AN ORGANIZED HEALTH CARE EDUCATION/TRAINING PROGRAM

## 2025-07-14 PROCEDURE — 2500000003 HC RX 250 WO HCPCS: Performed by: STUDENT IN AN ORGANIZED HEALTH CARE EDUCATION/TRAINING PROGRAM

## 2025-07-14 PROCEDURE — 83930 ASSAY OF BLOOD OSMOLALITY: CPT

## 2025-07-14 PROCEDURE — 6370000000 HC RX 637 (ALT 250 FOR IP): Performed by: STUDENT IN AN ORGANIZED HEALTH CARE EDUCATION/TRAINING PROGRAM

## 2025-07-14 PROCEDURE — 93306 TTE W/DOPPLER COMPLETE: CPT

## 2025-07-14 PROCEDURE — 6360000002 HC RX W HCPCS: Performed by: STUDENT IN AN ORGANIZED HEALTH CARE EDUCATION/TRAINING PROGRAM

## 2025-07-14 PROCEDURE — 99232 SBSQ HOSP IP/OBS MODERATE 35: CPT | Performed by: STUDENT IN AN ORGANIZED HEALTH CARE EDUCATION/TRAINING PROGRAM

## 2025-07-14 PROCEDURE — 2140000000 HC CCU INTERMEDIATE R&B

## 2025-07-14 PROCEDURE — 93010 ELECTROCARDIOGRAM REPORT: CPT | Performed by: INTERNAL MEDICINE

## 2025-07-14 PROCEDURE — 36415 COLL VENOUS BLD VENIPUNCTURE: CPT

## 2025-07-14 RX ORDER — BENZONATATE 100 MG/1
100 CAPSULE ORAL 3 TIMES DAILY PRN
Status: DISCONTINUED | OUTPATIENT
Start: 2025-07-14 | End: 2025-07-17 | Stop reason: HOSPADM

## 2025-07-14 RX ORDER — AMLODIPINE BESYLATE 5 MG/1
5 TABLET ORAL DAILY
Status: DISCONTINUED | OUTPATIENT
Start: 2025-07-14 | End: 2025-07-17 | Stop reason: HOSPADM

## 2025-07-14 RX ORDER — VENLAFAXINE HYDROCHLORIDE 37.5 MG/1
37.5 CAPSULE, EXTENDED RELEASE ORAL NIGHTLY
Status: DISCONTINUED | OUTPATIENT
Start: 2025-07-14 | End: 2025-07-17 | Stop reason: HOSPADM

## 2025-07-14 RX ADMIN — DOXYCYCLINE 100 MG: 100 INJECTION, POWDER, LYOPHILIZED, FOR SOLUTION INTRAVENOUS at 15:09

## 2025-07-14 RX ADMIN — ENOXAPARIN SODIUM 70 MG: 100 INJECTION SUBCUTANEOUS at 11:04

## 2025-07-14 RX ADMIN — SODIUM CHLORIDE, PRESERVATIVE FREE 10 ML: 5 INJECTION INTRAVENOUS at 11:04

## 2025-07-14 RX ADMIN — VENLAFAXINE HYDROCHLORIDE 75 MG: 75 CAPSULE, EXTENDED RELEASE ORAL at 11:03

## 2025-07-14 RX ADMIN — ENOXAPARIN SODIUM 70 MG: 100 INJECTION SUBCUTANEOUS at 20:50

## 2025-07-14 RX ADMIN — SODIUM CHLORIDE, PRESERVATIVE FREE 5 ML: 5 INJECTION INTRAVENOUS at 19:31

## 2025-07-14 RX ADMIN — VENLAFAXINE HYDROCHLORIDE 37.5 MG: 37.5 CAPSULE, EXTENDED RELEASE ORAL at 20:50

## 2025-07-14 RX ADMIN — AMLODIPINE BESYLATE 5 MG: 5 TABLET ORAL at 11:04

## 2025-07-14 RX ADMIN — LEVOTHYROXINE SODIUM 25 MCG: 25 TABLET ORAL at 08:02

## 2025-07-14 RX ADMIN — ACETAMINOPHEN 650 MG: 325 TABLET ORAL at 15:34

## 2025-07-14 RX ADMIN — DOXYCYCLINE 100 MG: 100 INJECTION, POWDER, LYOPHILIZED, FOR SOLUTION INTRAVENOUS at 05:22

## 2025-07-14 RX ADMIN — WATER 1000 MG: 1 INJECTION INTRAMUSCULAR; INTRAVENOUS; SUBCUTANEOUS at 15:04

## 2025-07-14 RX ADMIN — BENZONATATE 100 MG: 100 CAPSULE ORAL at 15:10

## 2025-07-14 RX ADMIN — ACETAMINOPHEN 650 MG: 325 TABLET ORAL at 00:07

## 2025-07-14 ASSESSMENT — PAIN - FUNCTIONAL ASSESSMENT: PAIN_FUNCTIONAL_ASSESSMENT: NONE - DENIES PAIN

## 2025-07-14 NOTE — ED NOTES
Applied ice packs to neck and bilateral armpits to help reduce fever. Patient is laying in bed, no distress noted. Call light in reach

## 2025-07-14 NOTE — ACP (ADVANCE CARE PLANNING)
Advance Care Planning   Healthcare Decision Maker:    Primary Decision Maker: Robert Villar - [facility] - [phone]    Click here to complete Healthcare Decision Makers including selection of the Healthcare Decision Maker Relationship (ie \"Primary\").  Today we documented Decision Maker(s) consistent with Legal Next of Kin hierarchy.       Electronically signed by Sally Carrizales RN on 7/14/2025 at 3:08 PM

## 2025-07-14 NOTE — PLAN OF CARE
Problem: Safety - Adult  Goal: Free from fall injury  7/14/2025 1947 by Ely Casas RN  Outcome: Progressing  7/14/2025 0950 by Zuleyma Pickett RN  Outcome: Progressing     Problem: Discharge Planning  Goal: Discharge to home or other facility with appropriate resources  7/14/2025 1947 by Ely Casas RN  Outcome: Progressing  7/14/2025 0950 by Zuleyma Pickett RN  Outcome: Progressing     Problem: Respiratory - Adult  Goal: Achieves optimal ventilation and oxygenation  7/14/2025 1947 by Ely Casas RN  Outcome: Progressing  7/14/2025 0950 by Zuleyma Pickett RN  Outcome: Progressing     Problem: Cardiovascular - Adult  Goal: Maintains optimal cardiac output and hemodynamic stability  7/14/2025 1947 by Ely Casas RN  Outcome: Progressing  7/14/2025 0950 by Zuleyma Pickett RN  Outcome: Progressing     Problem: Skin/Tissue Integrity - Adult  Goal: Skin integrity remains intact  7/14/2025 1947 by Ely Casas RN  Outcome: Progressing  7/14/2025 0950 by Zuleyma Pickett RN  Outcome: Progressing     Problem: Musculoskeletal - Adult  Goal: Return mobility to safest level of function  7/14/2025 1947 by Ely Casas RN  Outcome: Progressing  7/14/2025 0950 by Zuleyma Pickett RN  Outcome: Progressing  Goal: Return ADL status to a safe level of function  7/14/2025 1947 by Ely Casas RN  Outcome: Progressing  7/14/2025 0950 by Zuleyma Pickett RN  Outcome: Progressing     Problem: Gastrointestinal - Adult  Goal: Minimal or absence of nausea and vomiting  7/14/2025 1947 by Ely Casas RN  Outcome: Progressing  7/14/2025 0950 by Zuleyma Pickett RN  Outcome: Progressing  Goal: Maintains or returns to baseline bowel function  7/14/2025 1947 by Ely Casas RN  Outcome: Progressing  7/14/2025 0950 by Zuleyma Pickett RN  Outcome: Progressing  Goal: Maintains adequate nutritional intake  7/14/2025 1947 by Casas, Ely, RN  Outcome: Progressing  7/14/2025 0950 by Zuleyma Pickett,

## 2025-07-14 NOTE — PROGRESS NOTES
Hospitalist Progress Note      SYNOPSIS: Patient admitted on 2025 for Pneumonia due to infectious agent  80-year-old lady with past medical history of hypertension, hypothyroidism presented to the hospital with chief complaint of dizziness, weakness, passing out episode, fatigue, nonproductive cough, runny nose, sore throat, fever for 5 days.  ER course: Temp 99.1 F /58 HR 85 RR 18 SpO2 94% room air.  Blood work showing WBC 15.2 with increased absolute neutrophil count, sodium 125, chloride 88, glucose 101, BUN 16, creatinine 1.1, GFR 53, calcium 8.6, normal total protein, elevated LFTs, troponin 17.  Large proteinuria.  UA with leukocyte esterase, negative nitrates, ketones.  Urine osmolality 548, urine sodium less than 20.  CT head without contrast with normal ventricles and age-appropriate cerebral cortical atrophy.  CT cervical with no fracture or acute osseous abnormality.  CTA pulmonary with contrast with small questionable embolus at the bifurcation of the dictated segmental artery of the left upper lobe.  Pulmonary infiltrate in the right lower lobe likely representing pneumonia, right pleural effusion.  In the ER patient was given ceftriaxone, doxycycline, normal saline bolus  Patient was admitted under internal medicine service, continued antibiotic Rocephin and doxycycline, nephrology, EP service consulted      SUBJECTIVE:  Stable overnight. No other overnight issues reported.   Patient seen and examined at bedside today a.m. states improvement in her symptom, patient did have fever of 103 °F overnight.  Has been afebrile today morning  Records reviewed.         Temp (24hrs), Av.6 °F (37.6 °C), Min:97.9 °F (36.6 °C), Max:103.1 °F (39.5 °C)    DIET: ADULT DIET; Regular; 1500 ml  CODE: Full Code    Intake/Output Summary (Last 24 hours) at 2025 1106  Last data filed at 2025 1104  Gross per 24 hour   Intake 1005.07 ml   Output --   Net 1005.07 ml       Review of Systems  All

## 2025-07-14 NOTE — CARE COORDINATION
7/14:  Transition of care:  Pt presented to the Er for dizziness & fatigue from home.  Pt is on room air at 93%, Iv Doxycycline, Iv Rocephin & Sq Lovenox.  Nephrology consulted.  2 D echo needed.  Cm spoke with pt bedside with her  & brother at bedside to discuss CM role & dc planning.  Pt's PCP is Dr Wan & uses [facility] on [facility].  Pt lives with her  in a house with 2 steps to enter.  The bed/bathroom are on the 1st floor.  PTA pt was independent.  Pt states she is up in the room.  Pt has no HHC/SNF.  Pt's dc plan is home with family to transport.  Pt declined HHC at this time.  Sw/CM will continue to follow.  Electronically signed by Sally Carrizales RN on 7/14/2025 at 3:06 PM    Case Management Assessment  Initial Evaluation    Date/Time of Evaluation: 7/14/2025 3:07 PM  Assessment Completed by: Sally Carrizales RN    If patient is discharged prior to next notation, then this note serves as note for discharge by case management.    Patient Name: Shae Villar                   YOB: 1945  Diagnosis: Dizziness [R42]  Hyponatremia [E87.1]  Pneumonia due to infectious agent [J18.9]  Pneumonia of right lower lobe due to infectious organism [J18.9]  Single subsegmental pulmonary embolism without acute cor pulmonale (HCC) [I26.93]                   Date / Time: 7/13/2025 11:23 AM    Patient Admission Status: Inpatient   Readmission Risk (Low < 19, Mod (19-27), High > 27): Readmission Risk Score: 8.8    Current PCP: Ha Vences MD  PCP verified by CM? Yes    Chart Reviewed: Yes      History Provided by: Patient  Patient Orientation: Alert and Oriented, Person, Place, Situation, Self    Patient Cognition: Alert    Hospitalization in the last 30 days (Readmission):  No    If yes, Readmission Assessment in  Navigator will be completed.    Advance Directives:      Code Status: Full Code   Patient's Primary Decision Maker is:        Discharge Planning:    Patient lives with:    discharge plan. Freedom of choice list with basic dialogue that supports the patient's individualized plan of care/goals and shares the quality data associated with the providers was provided to:     Patient Representative Name:       The Patient and/or Patient Representative Agree with the Discharge Plan?      Sally Carrizales RN  Case Management Department  Ph: [phone]

## 2025-07-14 NOTE — FLOWSHEET NOTE
Orthostatic vital signs complete. Patient asymptomatic throughout and tolerated well.     07/14/25 0900   Vitals   BP Location Right upper arm   BP Method Automatic   Orthostatic B/P and Pulse? Yes   Blood Pressure Lying 111/64   Pulse Lying 75 PER MINUTE   Blood Pressure Sitting 105/65   Pulse Sitting 79 PER MINUTE   Blood Pressure Standing 109/65   Pulse Standing 79 PER MINUTE       CANDACE MUHAMMAD RN

## 2025-07-14 NOTE — PROGRESS NOTES
Home medications reviewed with patient and her pharmacy, [facility]. Notified Dr. Monteiro of changes. New orders received.    CANDACE MUHAMMAD RN

## 2025-07-14 NOTE — PROGRESS NOTES
Notified Dr. Monteiro of fever of 101.7, PRN Tylenol administered, and recheck temp of 100.8.    Cold washcloth and ice packs used to cool patient as well.    CANDACE MUHAMMAD RN

## 2025-07-14 NOTE — CONSULTS
[facility]  Nephrology Consult Note    Patient's Name: Shae Villar    Reason for Consult: Hyponatremia    Chief Complaint: Dizziness  History Obtained From:  patient, past medical records, and EMR    History of Present Illness:    Shae Villar is a 80 y.o. female with a past medical history of hypertension.  She presented to the ED on 7/13 reportedly for concerns of dizziness.  Vital signs on 7/13 includes temperature 99.1, respirations 18, pulse 87, /58, and she was 93% SpO2.  She later had a temperature noted at 103.1 on 7/14.  Lab data on 7/13 includes sodium 125, BUN 16, creatinine 1.1, calcium 8.6, procalcitonin 1.47, , WBC 15.2 K.  She had a CT head on 7/13 which showed normal ventricles and age-appropriate cerebral cortical atrophy.  CTA pulmonary 7/13 showed small questionable embolus at the bifurcation of the anterior segmental artery of the left upper lobe, pulmonary infiltrate in the right lower lobe, likely representing pneumonia, small right pleural effusion.  Nephrology has been consulted to see the patient for concerns of hyponatremia.  At present, patient was seen and examined.  She reportedly lost consciousness.  She also reportedly was not eating well.  She notes nausea/vomiting for approximately 1 week.    PMH:    Past Medical History:   Diagnosis Date    Acute H. pylori gastric ulcer     Hypertension     Varicose veins        Past Surgical History:   Procedure Laterality Date    COLONOSCOPY      OTHER SURGICAL HISTORY Right 5/15/15    lower extremity stab phlebectomies    WISDOM TOOTH EXTRACTION         Patient Active Problem List   Diagnosis    Varicose veins with pain    Chest pain    Essential hypertension    Left leg pain    Pneumonia due to infectious agent    Hyponatremia       Diet:    ADULT DIET; Regular; 1500 ml    Meds:     venlafaxine  37.5 mg Oral Nightly    amLODIPine  5 mg Oral Daily    sodium chloride flush  5-40 mL IntraVENous 2 times per day    enoxaparin  1

## 2025-07-14 NOTE — PROGRESS NOTES
4 Eyes Skin Assessment     NAME:  Shae Villar  YOB: 1945  MEDICAL RECORD NUMBER:  [id number]    The patient is being assessed for  Admission    I agree that at least one RN has performed a thorough Head to Toe Skin Assessment on the patient. ALL assessment sites listed below have been assessed.      Areas assessed by both nurses:    Head, Face, Ears, Shoulders, Back, Chest, Arms, Elbows, Hands, Sacrum. Buttock, Coccyx, Ischium, Legs. Feet and Heels, and Under Medical Devices         Does the Patient have a Wound? No noted wound(s)       Juan Prevention initiated by RN: No  Wound Care Orders initiated by RN: No    Pressure Injury (Stage 1,2,3,4, Unstageable, DTI, NWPT, and Complex wounds) if present, place Wound referral order by RN under : No    New Ostomies, if present place, Ostomy referral order under : No     Nurse 1 eSignature: Electronically signed by CANDACE MUHAMMAD RN on 7/14/25 at 6:17 PM EDT    **SHARE this note so that the co-signing nurse can place an eSignature**    Nurse 2 eSignature: Electronically signed by Cristo Valdez RN on 7/14/25 at 6:49 PM EDT

## 2025-07-14 NOTE — PROGRESS NOTES
Spiritual Health History and Assessment/Progress Note  [facility]    Initial Encounter, Spiritual/Emotional Needs,  ,  ,      Name: Shae Villar MRN: [id number]    Age: 80 y.o.     Sex: female   Language: English   Mormon: Shinto   Pneumonia due to infectious agent     Date: 7/14/2025                           Spiritual Assessment began in [facility]        Referral/Consult From: Rounding   Encounter Overview/Reason: Initial Encounter, Spiritual/Emotional Needs  Service Provided For: Patient and family together    Danii, Belief, Meaning:   Patient identifies as spiritual, is connected with a danii tradition or spiritual practice, and has beliefs or practices that help with coping during difficult times  Family/Friends identify as spiritual, are connected with a danii tradition or spiritual practice, and have beliefs or practices that help with coping during difficult times      Importance and Influence:  Patient has spiritual/personal beliefs that influence decisions regarding their health  Family/Friends have spiritual/personal beliefs that influence decisions regarding the patient's health    Community:  Patient is connected with a spiritual community and feels well-supported. Support system includes: Spouse/Partner, Danii Community, and Extended family  Family/Friends are connected with a spiritual community: and feel well-supported. Support system includes: Spouse/Partner, Danii Community, and Extended family    Assessment and Plan of Care:     Patient Interventions include: Facilitated expression of thoughts and feelings, Explored spiritual coping/struggle/distress, Engaged in theological reflection, and Affirmed coping skills/support systems  Family/Friends Interventions include: Facilitated expression of thoughts and feelings, Explored spiritual coping/struggle/distress, Engaged in theological reflection, and Affirmed coping skills/support systems    Patient Plan of Care: Other: The

## 2025-07-14 NOTE — PLAN OF CARE
Problem: Safety - Adult  Goal: Free from fall injury  Outcome: Progressing     Problem: Discharge Planning  Goal: Discharge to home or other facility with appropriate resources  Outcome: Progressing     Problem: Respiratory - Adult  Goal: Achieves optimal ventilation and oxygenation  Outcome: Progressing     Problem: Cardiovascular - Adult  Goal: Maintains optimal cardiac output and hemodynamic stability  Outcome: Progressing     Problem: Skin/Tissue Integrity - Adult  Goal: Skin integrity remains intact  Outcome: Progressing     Problem: Musculoskeletal - Adult  Goal: Return mobility to safest level of function  Outcome: Progressing  Goal: Return ADL status to a safe level of function  Outcome: Progressing     Problem: Gastrointestinal - Adult  Goal: Minimal or absence of nausea and vomiting  Outcome: Progressing  Goal: Maintains or returns to baseline bowel function  Outcome: Progressing  Goal: Maintains adequate nutritional intake  Outcome: Progressing     Problem: Infection - Adult  Goal: Absence of infection at discharge  Outcome: Progressing     Problem: Metabolic/Fluid and Electrolytes - Adult  Goal: Electrolytes maintained within normal limits  Outcome: Progressing     Problem: Hematologic - Adult  Goal: Maintains hematologic stability  Outcome: Progressing     Problem: Chronic Conditions and Co-morbidities  Goal: Patient's chronic conditions and co-morbidity symptoms are monitored and maintained or improved  Outcome: Progressing     Problem: Pain  Goal: Verbalizes/displays adequate comfort level or baseline comfort level  Outcome: Progressing

## 2025-07-15 ENCOUNTER — APPOINTMENT (OUTPATIENT)
Dept: CT IMAGING | Age: 80
DRG: 871 | End: 2025-07-15
Payer: MEDICARE

## 2025-07-15 LAB
ANION GAP SERPL CALCULATED.3IONS-SCNC: 13 MMOL/L (ref 7–16)
ANION GAP SERPL CALCULATED.3IONS-SCNC: 13 MMOL/L (ref 7–16)
ANION GAP SERPL CALCULATED.3IONS-SCNC: 16 MMOL/L (ref 7–16)
BASOPHILS # BLD: 0.08 K/UL (ref 0–0.2)
BASOPHILS NFR BLD: 1 % (ref 0–2)
BUN SERPL-MCNC: 14 MG/DL (ref 8–23)
BUN SERPL-MCNC: 16 MG/DL (ref 8–23)
BUN SERPL-MCNC: 17 MG/DL (ref 8–23)
CA-I BLD-SCNC: 1.11 MMOL/L (ref 1.15–1.33)
CALCIUM SERPL-MCNC: 8.4 MG/DL (ref 8.8–10.2)
CHLORIDE SERPL-SCNC: 94 MMOL/L (ref 98–107)
CHLORIDE SERPL-SCNC: 95 MMOL/L (ref 98–107)
CHLORIDE SERPL-SCNC: 95 MMOL/L (ref 98–107)
CO2 SERPL-SCNC: 18 MMOL/L (ref 22–29)
CO2 SERPL-SCNC: 21 MMOL/L (ref 22–29)
CO2 SERPL-SCNC: 22 MMOL/L (ref 22–29)
CREAT SERPL-MCNC: 0.8 MG/DL (ref 0.5–1)
CREAT SERPL-MCNC: 0.8 MG/DL (ref 0.5–1)
CREAT SERPL-MCNC: 0.9 MG/DL (ref 0.5–1)
ECHO AO ASC DIAM: 3.1 CM
ECHO AO ASCENDING AORTA INDEX: 1.86 CM/M2
ECHO AV AREA PEAK VELOCITY: 2.3 CM2
ECHO AV AREA VTI: 2.3 CM2
ECHO AV AREA/BSA PEAK VELOCITY: 1.4 CM2/M2
ECHO AV AREA/BSA VTI: 1.4 CM2/M2
ECHO AV CUSP MM: 1.7 CM
ECHO AV MEAN GRADIENT: 6 MMHG
ECHO AV MEAN VELOCITY: 1.1 M/S
ECHO AV PEAK GRADIENT: 11 MMHG
ECHO AV PEAK VELOCITY: 1.6 M/S
ECHO AV VELOCITY RATIO: 0.88
ECHO AV VTI: 29.9 CM
ECHO BSA: 1.7 M2
ECHO EST RA PRESSURE: 3 MMHG
ECHO LA VOL A-L A2C: 51 ML (ref 22–52)
ECHO LA VOL A-L A4C: 64 ML (ref 22–52)
ECHO LA VOL BP: 54 ML (ref 22–52)
ECHO LA VOL MOD A2C: 48 ML (ref 22–52)
ECHO LA VOL MOD A4C: 56 ML (ref 22–52)
ECHO LA VOL/BSA BIPLANE: 32 ML/M2 (ref 16–34)
ECHO LA VOLUME AREA LENGTH: 59 ML
ECHO LA VOLUME INDEX A-L A2C: 31 ML/M2 (ref 16–34)
ECHO LA VOLUME INDEX A-L A4C: 38 ML/M2 (ref 16–34)
ECHO LA VOLUME INDEX AREA LENGTH: 35 ML/M2 (ref 16–34)
ECHO LA VOLUME INDEX MOD A2C: 29 ML/M2 (ref 16–34)
ECHO LA VOLUME INDEX MOD A4C: 34 ML/M2 (ref 16–34)
ECHO LV E' LATERAL VELOCITY: 12 CM/S
ECHO LV E' SEPTAL VELOCITY: 9 CM/S
ECHO LV EJECTION FRACTION 3D: 55 %
ECHO LV FRACTIONAL SHORTENING: 42 % (ref 28–44)
ECHO LV INTERNAL DIMENSION DIASTOLE INDEX: 2.28 CM/M2
ECHO LV INTERNAL DIMENSION DIASTOLIC: 3.8 CM (ref 3.9–5.3)
ECHO LV INTERNAL DIMENSION SYSTOLIC INDEX: 1.32 CM/M2
ECHO LV INTERNAL DIMENSION SYSTOLIC: 2.2 CM
ECHO LV ISOVOLUMETRIC RELAXATION TIME (IVRT): 83.7 MS
ECHO LV IVSD: 1 CM (ref 0.6–0.9)
ECHO LV IVSS: 1.4 CM
ECHO LV MASS 2D: 117.3 G (ref 67–162)
ECHO LV MASS INDEX 2D: 70.2 G/M2 (ref 43–95)
ECHO LV POSTERIOR WALL DIASTOLIC: 1 CM (ref 0.6–0.9)
ECHO LV POSTERIOR WALL SYSTOLIC: 1.4 CM
ECHO LV RELATIVE WALL THICKNESS RATIO: 0.53
ECHO LVOT AREA: 2.8 CM2
ECHO LVOT AV VTI INDEX: 0.86
ECHO LVOT DIAM: 1.9 CM
ECHO LVOT MEAN GRADIENT: 4 MMHG
ECHO LVOT PEAK GRADIENT: 7 MMHG
ECHO LVOT PEAK VELOCITY: 1.4 M/S
ECHO LVOT STROKE VOLUME INDEX: 43.6 ML/M2
ECHO LVOT SV: 72.8 ML
ECHO LVOT VTI: 25.7 CM
ECHO MV "A" WAVE DURATION: 121.8 MSEC
ECHO MV A VELOCITY: 1.03 M/S
ECHO MV AREA PHT: 4.6 CM2
ECHO MV AREA VTI: 2.6 CM2
ECHO MV E DECELERATION TIME (DT): 179.2 MS
ECHO MV E VELOCITY: 0.92 M/S
ECHO MV E/A RATIO: 0.89
ECHO MV E/E' LATERAL: 7.67
ECHO MV E/E' RATIO (AVERAGED): 8.94
ECHO MV E/E' SEPTAL: 10.22
ECHO MV LVOT VTI INDEX: 1.07
ECHO MV MAX VELOCITY: 1.2 M/S
ECHO MV MEAN GRADIENT: 3 MMHG
ECHO MV MEAN VELOCITY: 0.8 M/S
ECHO MV PEAK GRADIENT: 6 MMHG
ECHO MV PRESSURE HALF TIME (PHT): 47.9 MS
ECHO MV VTI: 27.5 CM
ECHO PV MAX VELOCITY: 0.9 M/S
ECHO PV MEAN GRADIENT: 2 MMHG
ECHO PV MEAN VELOCITY: 0.7 M/S
ECHO PV PEAK GRADIENT: 3 MMHG
ECHO PV VTI: 17.4 CM
ECHO PVEIN A DURATION: 125.6 MS
ECHO PVEIN A VELOCITY: 0.4 M/S
ECHO PVEIN PEAK D VELOCITY: 0.3 M/S
ECHO PVEIN PEAK S VELOCITY: 0.5 M/S
ECHO PVEIN S/D RATIO: 1.7
ECHO RIGHT VENTRICULAR SYSTOLIC PRESSURE (RVSP): 41 MMHG
ECHO RV INTERNAL DIMENSION: 2.9 CM
ECHO RV TAPSE: 1.7 CM (ref 1.7–?)
ECHO TV REGURGITANT MAX VELOCITY: 3.1 M/S
ECHO TV REGURGITANT PEAK GRADIENT: 38 MMHG
EKG ATRIAL RATE: 89 BPM
EKG P AXIS: 63 DEGREES
EKG P-R INTERVAL: 136 MS
EKG Q-T INTERVAL: 360 MS
EKG QRS DURATION: 80 MS
EKG QTC CALCULATION (BAZETT): 438 MS
EKG R AXIS: -13 DEGREES
EKG T AXIS: 54 DEGREES
EKG VENTRICULAR RATE: 89 BPM
EOSINOPHIL # BLD: 0.01 K/UL (ref 0.05–0.5)
EOSINOPHILS RELATIVE PERCENT: 0 % (ref 0–6)
ERYTHROCYTE [DISTWIDTH] IN BLOOD BY AUTOMATED COUNT: 13.2 % (ref 11.5–15)
GFR, ESTIMATED: 66 ML/MIN/1.73M2
GFR, ESTIMATED: 71 ML/MIN/1.73M2
GFR, ESTIMATED: 76 ML/MIN/1.73M2
GLUCOSE SERPL-MCNC: 101 MG/DL (ref 74–99)
GLUCOSE SERPL-MCNC: 120 MG/DL (ref 74–99)
GLUCOSE SERPL-MCNC: 94 MG/DL (ref 74–99)
HCT VFR BLD AUTO: 37 % (ref 34–48)
HGB BLD-MCNC: 12.4 G/DL (ref 11.5–15.5)
IMM GRANULOCYTES # BLD AUTO: 0.27 K/UL (ref 0–0.58)
IMM GRANULOCYTES NFR BLD: 3 % (ref 0–5)
LYMPHOCYTES NFR BLD: 0.63 K/UL (ref 1.5–4)
LYMPHOCYTES RELATIVE PERCENT: 6 % (ref 20–42)
MAGNESIUM SERPL-MCNC: 2.1 MG/DL (ref 1.6–2.4)
MAGNESIUM SERPL-MCNC: 2.2 MG/DL (ref 1.6–2.4)
MCH RBC QN AUTO: 30.7 PG (ref 26–35)
MCHC RBC AUTO-ENTMCNC: 33.5 G/DL (ref 32–34.5)
MCV RBC AUTO: 91.6 FL (ref 80–99.9)
MICROORGANISM SPEC CULT: NORMAL
MONOCYTES NFR BLD: 0.87 K/UL (ref 0.1–0.95)
MONOCYTES NFR BLD: 8 % (ref 2–12)
NEUTROPHILS NFR BLD: 83 % (ref 43–80)
NEUTS SEG NFR BLD: 8.83 K/UL (ref 1.8–7.3)
PHOSPHATE SERPL-MCNC: 2 MG/DL (ref 2.5–4.5)
PLATELET # BLD AUTO: 325 K/UL (ref 130–450)
PMV BLD AUTO: 11.2 FL (ref 7–12)
POTASSIUM SERPL-SCNC: 3.3 MMOL/L (ref 3.5–5.1)
POTASSIUM SERPL-SCNC: 3.5 MMOL/L (ref 3.5–5.1)
POTASSIUM SERPL-SCNC: 4.1 MMOL/L (ref 3.5–5.1)
RBC # BLD AUTO: 4.04 M/UL (ref 3.5–5.5)
SODIUM SERPL-SCNC: 128 MMOL/L (ref 136–145)
SODIUM SERPL-SCNC: 129 MMOL/L (ref 136–145)
SODIUM SERPL-SCNC: 129 MMOL/L (ref 136–145)
SPECIMEN DESCRIPTION: NORMAL
WBC OTHER # BLD: 10.7 K/UL (ref 4.5–11.5)

## 2025-07-15 PROCEDURE — 6370000000 HC RX 637 (ALT 250 FOR IP): Performed by: STUDENT IN AN ORGANIZED HEALTH CARE EDUCATION/TRAINING PROGRAM

## 2025-07-15 PROCEDURE — 80048 BASIC METABOLIC PNL TOTAL CA: CPT

## 2025-07-15 PROCEDURE — 6360000002 HC RX W HCPCS

## 2025-07-15 PROCEDURE — 6360000002 HC RX W HCPCS: Performed by: STUDENT IN AN ORGANIZED HEALTH CARE EDUCATION/TRAINING PROGRAM

## 2025-07-15 PROCEDURE — 99232 SBSQ HOSP IP/OBS MODERATE 35: CPT | Performed by: STUDENT IN AN ORGANIZED HEALTH CARE EDUCATION/TRAINING PROGRAM

## 2025-07-15 PROCEDURE — 84100 ASSAY OF PHOSPHORUS: CPT

## 2025-07-15 PROCEDURE — 93306 TTE W/DOPPLER COMPLETE: CPT | Performed by: INTERNAL MEDICINE

## 2025-07-15 PROCEDURE — 93005 ELECTROCARDIOGRAM TRACING: CPT | Performed by: STUDENT IN AN ORGANIZED HEALTH CARE EDUCATION/TRAINING PROGRAM

## 2025-07-15 PROCEDURE — 83735 ASSAY OF MAGNESIUM: CPT

## 2025-07-15 PROCEDURE — 97166 OT EVAL MOD COMPLEX 45 MIN: CPT

## 2025-07-15 PROCEDURE — 87040 BLOOD CULTURE FOR BACTERIA: CPT

## 2025-07-15 PROCEDURE — 2580000003 HC RX 258: Performed by: STUDENT IN AN ORGANIZED HEALTH CARE EDUCATION/TRAINING PROGRAM

## 2025-07-15 PROCEDURE — 85025 COMPLETE CBC W/AUTO DIFF WBC: CPT

## 2025-07-15 PROCEDURE — 93010 ELECTROCARDIOGRAM REPORT: CPT | Performed by: INTERNAL MEDICINE

## 2025-07-15 PROCEDURE — 97161 PT EVAL LOW COMPLEX 20 MIN: CPT

## 2025-07-15 PROCEDURE — 2140000000 HC CCU INTERMEDIATE R&B

## 2025-07-15 PROCEDURE — 82330 ASSAY OF CALCIUM: CPT

## 2025-07-15 PROCEDURE — 97530 THERAPEUTIC ACTIVITIES: CPT

## 2025-07-15 PROCEDURE — 36415 COLL VENOUS BLD VENIPUNCTURE: CPT

## 2025-07-15 PROCEDURE — 6370000000 HC RX 637 (ALT 250 FOR IP)

## 2025-07-15 PROCEDURE — 2500000003 HC RX 250 WO HCPCS: Performed by: STUDENT IN AN ORGANIZED HEALTH CARE EDUCATION/TRAINING PROGRAM

## 2025-07-15 PROCEDURE — 70450 CT HEAD/BRAIN W/O DYE: CPT

## 2025-07-15 PROCEDURE — 97535 SELF CARE MNGMENT TRAINING: CPT

## 2025-07-15 RX ORDER — POTASSIUM CHLORIDE 1500 MG/1
20 TABLET, EXTENDED RELEASE ORAL ONCE
Status: COMPLETED | OUTPATIENT
Start: 2025-07-15 | End: 2025-07-15

## 2025-07-15 RX ORDER — CALCIUM GLUCONATE 20 MG/ML
1000 INJECTION, SOLUTION INTRAVENOUS ONCE
Status: COMPLETED | OUTPATIENT
Start: 2025-07-15 | End: 2025-07-15

## 2025-07-15 RX ADMIN — SODIUM CHLORIDE, PRESERVATIVE FREE 10 ML: 5 INJECTION INTRAVENOUS at 09:22

## 2025-07-15 RX ADMIN — POTASSIUM CHLORIDE 20 MEQ: 1500 TABLET, EXTENDED RELEASE ORAL at 16:25

## 2025-07-15 RX ADMIN — ENOXAPARIN SODIUM 70 MG: 100 INJECTION SUBCUTANEOUS at 21:39

## 2025-07-15 RX ADMIN — VENLAFAXINE HYDROCHLORIDE 75 MG: 75 CAPSULE, EXTENDED RELEASE ORAL at 09:22

## 2025-07-15 RX ADMIN — DOXYCYCLINE 100 MG: 100 INJECTION, POWDER, LYOPHILIZED, FOR SOLUTION INTRAVENOUS at 03:30

## 2025-07-15 RX ADMIN — Medication 250 MG: at 16:26

## 2025-07-15 RX ADMIN — WATER 1000 MG: 1 INJECTION INTRAMUSCULAR; INTRAVENOUS; SUBCUTANEOUS at 14:41

## 2025-07-15 RX ADMIN — Medication 250 MG: at 09:59

## 2025-07-15 RX ADMIN — SODIUM CHLORIDE, PRESERVATIVE FREE 5 ML: 5 INJECTION INTRAVENOUS at 19:53

## 2025-07-15 RX ADMIN — VENLAFAXINE HYDROCHLORIDE 37.5 MG: 37.5 CAPSULE, EXTENDED RELEASE ORAL at 21:39

## 2025-07-15 RX ADMIN — LEVOTHYROXINE SODIUM 25 MCG: 25 TABLET ORAL at 05:16

## 2025-07-15 RX ADMIN — AMLODIPINE BESYLATE 5 MG: 5 TABLET ORAL at 09:22

## 2025-07-15 RX ADMIN — ENOXAPARIN SODIUM 70 MG: 100 INJECTION SUBCUTANEOUS at 09:22

## 2025-07-15 RX ADMIN — DOXYCYCLINE 100 MG: 100 INJECTION, POWDER, LYOPHILIZED, FOR SOLUTION INTRAVENOUS at 14:54

## 2025-07-15 RX ADMIN — CALCIUM GLUCONATE 1000 MG: 20 INJECTION, SOLUTION INTRAVENOUS at 10:08

## 2025-07-15 NOTE — PROGRESS NOTES
[facility]  Nephrology Progress Note    Patient's Name: Shae Villar    History of Present Illness from 7/14 consult note:    \"Shae Villar is a 80 y.o. female with a past medical history of hypertension.  She presented to the ED on 7/13 reportedly for concerns of dizziness.  Vital signs on 7/13 includes temperature 99.1, respirations 18, pulse 87, /58, and she was 93% SpO2.  She later had a temperature noted at 103.1 on 7/14.  Lab data on 7/13 includes sodium 125, BUN 16, creatinine 1.1, calcium 8.6, procalcitonin 1.47, , WBC 15.2 K.  She had a CT head on 7/13 which showed normal ventricles and age-appropriate cerebral cortical atrophy.  CTA pulmonary 7/13 showed small questionable embolus at the bifurcation of the anterior segmental artery of the left upper lobe, pulmonary infiltrate in the right lower lobe, likely representing pneumonia, small right pleural effusion.  Nephrology has been consulted to see the patient for concerns of hyponatremia.  At present, patient was seen and examined.  She reportedly lost consciousness.  She also reportedly was not eating well.  She notes nausea/vomiting for approximately 1 week.\"    Subjective:    7/15/2025: Patient was seen and examined.  She is awake and notes that she feels better.  She denies any dizziness.  She reportedly had a fall in the early am.     PMH:    Past Medical History:   Diagnosis Date    Acute H. pylori gastric ulcer     Hypertension     Varicose veins        Past Surgical History:   Procedure Laterality Date    COLONOSCOPY      OTHER SURGICAL HISTORY Right 5/15/15    lower extremity stab phlebectomies    WISDOM TOOTH EXTRACTION         Patient Active Problem List   Diagnosis    Varicose veins with pain    Chest pain    Essential hypertension    Left leg pain    Pneumonia due to infectious agent    Hyponatremia       Diet:    ADULT DIET; Regular; 1500 ml    Meds:     potassium & sodium phosphates  1 packet Oral BID    calcium gluconate  12:16 PM    UROBILINOGEN 1.0 07/13/2025 12:16 PM    BILIRUBINUR SMALL 07/13/2025 12:16 PM       Lab Results   Component Value Date/Time    PROTEIN 6.8 07/13/2025 11:58 AM    OSMOU 548 07/13/2025 12:16 PM       No components found for: \"URIC\"    No results found for: \"LIPIDPAN\"    Assessment and Plans:    Hyponatremia, hypotonic   Suspect with HCTZ and venlafaxine; also suspected excessive water intake and poor intake   Sodium 125 on 7/13--> 129 today  Uric acid 5.1  TSH 1.22  Urine sodium<20, urine osmolality 548  serum osmolality 280  Strict I&O  Hold HCTZ - would not resume  Monitor labs-BMP every 12 hours    2.  High anion gap metabolic acidosis  Anion gap 17/CO2 20 on 7/13--> CO2 22 today  UA specific gravity 1.025, trace ketones  Start sodium HCO3 if CO2<20  Monitor labs    3.  Hypocalcemia, hypokalemia, hypophosphatemia   Hypocalcemia suspect with hypoalbuminemia  Hypokalemia and hypophosphatemia-suspect with poor oral intake  Calcium 8.4 with ionized calcium 1.11 today  Potassium 3.3 and phosphorus 2 today  Supplement potassium, phosphorus, calcium today   Monitor labs    4.  Fever/leukocytosis/pneumonia  Temperature noted 103.1 on 7/14  WBC 15.2 K on 7/13--> 10.7 K  Respiratory panel (-)  Await blood cultures  CTA pulm 7/13-small questionable embolus at bifurcation of anterior segmental artery of L upper lobe, pulminfiltrate in RLL, likely representing PNA, small R pleural effusion  Antibiotics per primary service-on Rocephin and Doxy  Defer to primary service    Giovana Hayward, APRN - CNP    Patient seen and examined all key components of the physical performed independently , case discussed with NP, all pertinent labs and radiologic tests personally reviewed agree with above.    -Reports dizziness persists; orthostatic vitals not positive; brain MRI ordered; await results  -hyponatremia with improving trend; autocorrecting ; continue to monitor    Sam De La Vega MD

## 2025-07-15 NOTE — CARE COORDINATION
7/15:  Update CM Note:  Pt presented to the Er for dizziness & fatigue from home. Pt is on room air at 94%, Iv Doxycycline, Iv Rocephin & Sq Lovenox. PT 16/24 OT 15/24.  Pt's dc plan is still home with family to transport.  Pt is now open to [facility] & choice [facility].  CM sent referral to [facility] via [facility].  Sw/CM will continue to follow.  Electronically signed by Sally Carrizales RN on 7/15/2025 at 11:37 AM

## 2025-07-15 NOTE — PROGRESS NOTES
Hospitalist Progress Note      SYNOPSIS: Patient admitted on 2025 for Pneumonia due to infectious agent  80-year-old lady with past medical history of hypertension, hypothyroidism presented to the hospital with chief complaint of dizziness, weakness, passing out episode, fatigue, nonproductive cough, runny nose, sore throat, fever for 5 days.  ER course: Temp 99.1 F /58 HR 85 RR 18 SpO2 94% room air.  Blood work showing WBC 15.2 with increased absolute neutrophil count, sodium 125, chloride 88, glucose 101, BUN 16, creatinine 1.1, GFR 53, calcium 8.6, normal total protein, elevated LFTs, troponin 17.  Large proteinuria.  UA with leukocyte esterase, negative nitrates, ketones.  Urine osmolality 548, urine sodium less than 20.  CT head without contrast with normal ventricles and age-appropriate cerebral cortical atrophy.  CT cervical with no fracture or acute osseous abnormality.  CTA pulmonary with contrast with small questionable embolus at the bifurcation of the dictated segmental artery of the left upper lobe.  Pulmonary infiltrate in the right lower lobe likely representing pneumonia, right pleural effusion.  In the ER patient was given ceftriaxone, doxycycline, normal saline bolus  Patient was admitted under internal medicine service, continued antibiotic Rocephin and doxycycline, nephrology, EP service consulted   patient had a fall, CT head was done which was negative, still complains of persistent unsteadiness during walking, MRI brain without contrast ordered    SUBJECTIVE:  Patient did have a fall overnight, patient stated that she felt unsteady and fell  Patient seen and examined at bedside today a.m. does state improvement in her symptom, last fever was yesterday around 3 .7  Patient also complains of unsteady gait whenever she walks  Records reviewed.       Temp (24hrs), Av °F (37.2 °C), Min:97.8 °F (36.6 °C), Max:101.7 °F (38.7 °C)    DIET: ADULT DIET; Regular; 1500

## 2025-07-15 NOTE — PROGRESS NOTES
Physical Therapy  Physical Therapy Initial Assessment       Name: Shae Villar  : 1945  MRN: [id number]      Date of Service: 7/15/2025    Evaluating PT:  Mary Lopez PT, DPT [id number]    Room #:  6420/6420-A  Diagnosis:  Dizziness [R42]  Hyponatremia [E87.1]  Pneumonia due to infectious agent [J18.9]  Pneumonia of right lower lobe due to infectious organism [J18.9]  Single subsegmental pulmonary embolism without acute cor pulmonale (HCC) [I26.93]  PMHx/PSHx:   has a past medical history of Acute H. pylori gastric ulcer, Hypertension, and Varicose veins.    Procedure/Surgery:  None  Precautions: Falls, continuous pulse ox, alarms    SUBJECTIVE:    Pt lives with  in a 2 story home with 2 stairs to enter and one rail(s).    Bed is on level floor and bath is on level floor.    Pt ambulated with no AD PTA.    Equipment Owned: None  Equipment Needs:  WW    OBJECTIVE:   Initial Evaluation  Date: 7/15/25 Treatment  Date:  Short Term/ Long Term   Goals   AM-PAC 6 Clicks      Was pt agreeable to Eval/treatment? Yes      Does pt have pain? No pain reported     Bed Mobility  Rolling: SBA  Supine to sit: SBA  Sit to supine: NT  Scooting: SBA  Rolling: Independent  Supine to sit: Independent  Sit to supine: Independent  Scooting: Independent   Transfers Sit to stand: Jenn  Stand to sit: Jenn  Stand pivot: ModA with no AD  Jenn with WW  Sit to stand: Supervision  Stand to sit: Supervision  Stand pivot: Supervision with WW   Ambulation    25 feet with ModA with no AD    25 x 2 feet with Jenn with WW  >300 feet with Supervision with WW   Stair negotiation: ascended and descended  NT  4 steps with 1 rail(s) Supervision    ROM BUE:  Defer to OT  BLE: WFL     Strength BUE:  Defer to OT  BLE:  Grossly 4/5, BLE knee extension 3+/5  Improve 1 MMT   Balance Sitting EOB:  SBA  Dynamic Standing:  ModA with no AD  Jenn with WW  Sitting EOB:  Independent  Dynamic Standing:  Supervision with WW     Pt is A & O x 4. Pt required  treatment:    Therapeutic activities:   Bed Mobility: Verbal cues for proper positioning and sequencing to perform bed mobility to facilitate maximum independence.   Pt sat EOB to promote upright tolerance, B LE ROM, and postural awareness. VC for posture, skilled guarding/ assistance provided as needed.   Transfers: Verbal cues for proper positioning and sequencing to perform transfers safely with maximum independence.   Gait Training: Verbal cues for safety and pacing to maximize functional mobility independence.   Pt education for safety with all mobility, activity pacing, and WW use.   Vitals and symptoms monitored during session.     Pt's/ family goals   1. Return to PLOF.      Prognosis is good for reaching above PT goals.    Patient and or family understand(s) diagnosis, prognosis, and plan of care.  Yes    PHYSICAL THERAPY PLAN OF CARE:    PT POC is established based on physician order and patient diagnosis     Referring provider/PT Order:    07/15/25 0815  PT eval and treat  Start:  07/15/25 0815,   End:  07/15/25 0815,   ONE TIME,   Standing Count:  1 Occurrences,   R         Brown Monteiro MD   Diagnosis:  Dizziness [R42]  Hyponatremia [E87.1]  Pneumonia due to infectious agent [J18.9]  Pneumonia of right lower lobe due to infectious organism [J18.9]  Single subsegmental pulmonary embolism without acute cor pulmonale (HCC) [I26.93]  Specific instructions for next treatment:  Progress functional mobility.     Current Treatment Recommendations:     [x] Strengthening to improve independence with functional mobility   [] ROM to improve independence with functional mobility   [x] Balance Training to improve static/dynamic balance and to reduce fall risk  [x] Endurance Training to improve activity tolerance during functional mobility   [x] Transfer Training to improve safety and independence with all functional transfers   [x] Gait Training to improve gait mechanics, endurance and assess need for appropriate

## 2025-07-15 NOTE — PROGRESS NOTES
Unwitnessed fall around 0330--patient found on ground next to hospital bed. Assessment of patient was performed, patient A+Ox4, vital signs, and 12-lead EKG performed showing normal sinus rhythm. /70, heart rate 87, SpO2 91 on room air. NIHSS scale also performed, patient scored 0. NP Layla Johnson notified of event, and CT head w/o contrast ordered and performed.
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
## 2025-07-15 NOTE — PLAN OF CARE
Problem: Safety - Adult  Goal: Free from fall injury  7/15/2025 0931 by Zuleyma Pickett RN  Outcome: Progressing  7/14/2025 1947 by Ely Casas RN  Outcome: Progressing     Problem: Discharge Planning  Goal: Discharge to home or other facility with appropriate resources  7/15/2025 0931 by Zuleyma Pickett RN  Outcome: Progressing  7/14/2025 1947 by Ely Casas RN  Outcome: Progressing     Problem: Respiratory - Adult  Goal: Achieves optimal ventilation and oxygenation  7/15/2025 0931 by Zuleyma Pickett RN  Outcome: Progressing  7/14/2025 1947 by Ely Casas RN  Outcome: Progressing     Problem: Cardiovascular - Adult  Goal: Maintains optimal cardiac output and hemodynamic stability  7/15/2025 0931 by Zuleyma Pickett RN  Outcome: Progressing  7/14/2025 1947 by Ely Casas RN  Outcome: Progressing     Problem: Skin/Tissue Integrity - Adult  Goal: Skin integrity remains intact  7/15/2025 0931 by Zuleyma Pickett RN  Outcome: Progressing  7/14/2025 1947 by Ely Casas RN  Outcome: Progressing     Problem: Musculoskeletal - Adult  Goal: Return mobility to safest level of function  7/15/2025 0931 by Zuleyma Pickett RN  Outcome: Progressing  7/14/2025 1947 by Ely Casas RN  Outcome: Progressing  Goal: Return ADL status to a safe level of function  7/15/2025 0931 by Zuleyma Pickett RN  Outcome: Progressing  7/14/2025 1947 by Ely Casas RN  Outcome: Progressing     Problem: Gastrointestinal - Adult  Goal: Minimal or absence of nausea and vomiting  7/15/2025 0931 by Zuleyma Pickett RN  Outcome: Progressing  7/14/2025 1947 by Ely Casas RN  Outcome: Progressing  Goal: Maintains or returns to baseline bowel function  7/15/2025 0931 by Zulyema Pickett RN  Outcome: Progressing  7/14/2025 1947 by Ely Casas RN  Outcome: Progressing  Goal: Maintains adequate nutritional intake  7/15/2025 0931 by Pickett, Zuleyma, RN  Outcome: Progressing  7/14/2025 1947 by Ely Casas,  RN  Outcome: Progressing     Problem: Infection - Adult  Goal: Absence of infection at discharge  7/15/2025 0931 by Zuleyma Pickett RN  Outcome: Progressing  7/14/2025 1947 by Ely Casas RN  Outcome: Progressing     Problem: Metabolic/Fluid and Electrolytes - Adult  Goal: Electrolytes maintained within normal limits  7/15/2025 0931 by Zuleyma Pickett RN  Outcome: Progressing  7/14/2025 1947 by Ely Casas RN  Outcome: Progressing     Problem: Hematologic - Adult  Goal: Maintains hematologic stability  7/15/2025 0931 by Zuleyma Pickett RN  Outcome: Progressing  7/14/2025 1947 by Ely Casas RN  Outcome: Progressing     Problem: Chronic Conditions and Co-morbidities  Goal: Patient's chronic conditions and co-morbidity symptoms are monitored and maintained or improved  7/15/2025 0931 by Zuleyma Pickett RN  Outcome: Progressing  7/14/2025 1947 by Ely Casas RN  Outcome: Progressing     Problem: Pain  Goal: Verbalizes/displays adequate comfort level or baseline comfort level  7/15/2025 0931 by Zuleyma Pickett RN  Outcome: Progressing  7/14/2025 1947 by Ely Casas RN  Outcome: Progressing

## 2025-07-16 ENCOUNTER — APPOINTMENT (OUTPATIENT)
Dept: MRI IMAGING | Age: 80
DRG: 871 | End: 2025-07-16
Payer: MEDICARE

## 2025-07-16 LAB
ANION GAP SERPL CALCULATED.3IONS-SCNC: 12 MMOL/L (ref 7–16)
ANION GAP SERPL CALCULATED.3IONS-SCNC: 12 MMOL/L (ref 7–16)
BASOPHILS # BLD: 0 K/UL (ref 0–0.2)
BASOPHILS NFR BLD: 0 % (ref 0–2)
BUN SERPL-MCNC: 18 MG/DL (ref 8–23)
BUN SERPL-MCNC: 26 MG/DL (ref 8–23)
CA-I BLD-SCNC: 1.17 MMOL/L (ref 1.15–1.33)
CALCIUM SERPL-MCNC: 8.3 MG/DL (ref 8.8–10.2)
CALCIUM SERPL-MCNC: 8.6 MG/DL (ref 8.8–10.2)
CHLORIDE SERPL-SCNC: 96 MMOL/L (ref 98–107)
CHLORIDE SERPL-SCNC: 99 MMOL/L (ref 98–107)
CO2 SERPL-SCNC: 23 MMOL/L (ref 22–29)
CO2 SERPL-SCNC: 23 MMOL/L (ref 22–29)
CREAT SERPL-MCNC: 0.8 MG/DL (ref 0.5–1)
CREAT SERPL-MCNC: 0.8 MG/DL (ref 0.5–1)
EOSINOPHIL # BLD: 0.14 K/UL (ref 0.05–0.5)
EOSINOPHILS RELATIVE PERCENT: 2 % (ref 0–6)
ERYTHROCYTE [DISTWIDTH] IN BLOOD BY AUTOMATED COUNT: 13.3 % (ref 11.5–15)
GFR, ESTIMATED: 71 ML/MIN/1.73M2
GFR, ESTIMATED: 73 ML/MIN/1.73M2
GLUCOSE SERPL-MCNC: 108 MG/DL (ref 74–99)
GLUCOSE SERPL-MCNC: 90 MG/DL (ref 74–99)
HCT VFR BLD AUTO: 37.1 % (ref 34–48)
HGB BLD-MCNC: 12.1 G/DL (ref 11.5–15.5)
LYMPHOCYTES NFR BLD: 2.02 K/UL (ref 1.5–4)
LYMPHOCYTES RELATIVE PERCENT: 24 % (ref 20–42)
MAGNESIUM SERPL-MCNC: 2.3 MG/DL (ref 1.6–2.4)
MCH RBC QN AUTO: 30.4 PG (ref 26–35)
MCHC RBC AUTO-ENTMCNC: 32.6 G/DL (ref 32–34.5)
MCV RBC AUTO: 93.2 FL (ref 80–99.9)
METAMYELOCYTES ABSOLUTE COUNT: 0.07 K/UL (ref 0–0.12)
METAMYELOCYTES: 1 % (ref 0–1)
MONOCYTES NFR BLD: 1.8 K/UL (ref 0.1–0.95)
MONOCYTES NFR BLD: 22 % (ref 2–12)
NEUTROPHILS NFR BLD: 51 % (ref 43–80)
NEUTS SEG NFR BLD: 4.26 K/UL (ref 1.8–7.3)
PHOSPHATE SERPL-MCNC: 3.2 MG/DL (ref 2.5–4.5)
PLATELET # BLD AUTO: 334 K/UL (ref 130–450)
PMV BLD AUTO: 11.4 FL (ref 7–12)
POTASSIUM SERPL-SCNC: 3.7 MMOL/L (ref 3.5–5.1)
POTASSIUM SERPL-SCNC: 4 MMOL/L (ref 3.5–5.1)
RBC # BLD AUTO: 3.98 M/UL (ref 3.5–5.5)
RBC # BLD: ABNORMAL 10*6/UL
SODIUM SERPL-SCNC: 131 MMOL/L (ref 136–145)
SODIUM SERPL-SCNC: 134 MMOL/L (ref 136–145)
WBC OTHER # BLD: 8.3 K/UL (ref 4.5–11.5)

## 2025-07-16 PROCEDURE — 36415 COLL VENOUS BLD VENIPUNCTURE: CPT

## 2025-07-16 PROCEDURE — 80048 BASIC METABOLIC PNL TOTAL CA: CPT

## 2025-07-16 PROCEDURE — 82330 ASSAY OF CALCIUM: CPT

## 2025-07-16 PROCEDURE — 2140000000 HC CCU INTERMEDIATE R&B

## 2025-07-16 PROCEDURE — 97530 THERAPEUTIC ACTIVITIES: CPT

## 2025-07-16 PROCEDURE — 84100 ASSAY OF PHOSPHORUS: CPT

## 2025-07-16 PROCEDURE — 2500000003 HC RX 250 WO HCPCS: Performed by: STUDENT IN AN ORGANIZED HEALTH CARE EDUCATION/TRAINING PROGRAM

## 2025-07-16 PROCEDURE — 83735 ASSAY OF MAGNESIUM: CPT

## 2025-07-16 PROCEDURE — 70551 MRI BRAIN STEM W/O DYE: CPT

## 2025-07-16 PROCEDURE — 99232 SBSQ HOSP IP/OBS MODERATE 35: CPT | Performed by: STUDENT IN AN ORGANIZED HEALTH CARE EDUCATION/TRAINING PROGRAM

## 2025-07-16 PROCEDURE — 6370000000 HC RX 637 (ALT 250 FOR IP): Performed by: STUDENT IN AN ORGANIZED HEALTH CARE EDUCATION/TRAINING PROGRAM

## 2025-07-16 PROCEDURE — 97535 SELF CARE MNGMENT TRAINING: CPT

## 2025-07-16 PROCEDURE — 2580000003 HC RX 258: Performed by: STUDENT IN AN ORGANIZED HEALTH CARE EDUCATION/TRAINING PROGRAM

## 2025-07-16 PROCEDURE — 85025 COMPLETE CBC W/AUTO DIFF WBC: CPT

## 2025-07-16 PROCEDURE — 6360000002 HC RX W HCPCS: Performed by: STUDENT IN AN ORGANIZED HEALTH CARE EDUCATION/TRAINING PROGRAM

## 2025-07-16 RX ADMIN — LEVOTHYROXINE SODIUM 25 MCG: 25 TABLET ORAL at 05:21

## 2025-07-16 RX ADMIN — ENOXAPARIN SODIUM 70 MG: 100 INJECTION SUBCUTANEOUS at 20:54

## 2025-07-16 RX ADMIN — WATER 1000 MG: 1 INJECTION INTRAMUSCULAR; INTRAVENOUS; SUBCUTANEOUS at 14:57

## 2025-07-16 RX ADMIN — SODIUM CHLORIDE, PRESERVATIVE FREE 5 ML: 5 INJECTION INTRAVENOUS at 19:46

## 2025-07-16 RX ADMIN — DOXYCYCLINE 100 MG: 100 INJECTION, POWDER, LYOPHILIZED, FOR SOLUTION INTRAVENOUS at 15:02

## 2025-07-16 RX ADMIN — ENOXAPARIN SODIUM 70 MG: 100 INJECTION SUBCUTANEOUS at 09:25

## 2025-07-16 RX ADMIN — VENLAFAXINE HYDROCHLORIDE 75 MG: 75 CAPSULE, EXTENDED RELEASE ORAL at 09:25

## 2025-07-16 RX ADMIN — DOXYCYCLINE 100 MG: 100 INJECTION, POWDER, LYOPHILIZED, FOR SOLUTION INTRAVENOUS at 02:30

## 2025-07-16 RX ADMIN — VENLAFAXINE HYDROCHLORIDE 37.5 MG: 37.5 CAPSULE, EXTENDED RELEASE ORAL at 20:53

## 2025-07-16 RX ADMIN — SODIUM CHLORIDE, PRESERVATIVE FREE 10 ML: 5 INJECTION INTRAVENOUS at 09:26

## 2025-07-16 RX ADMIN — AMLODIPINE BESYLATE 5 MG: 5 TABLET ORAL at 09:25

## 2025-07-16 NOTE — PROGRESS NOTES
Patient received the Sacrament of the Anointing of the Sick by Father Arvin Barrera on Tuesday, July 16, 2025.    If additional support is requested or needed please reach out to Spiritual Health ([id number]).    Chap. David Forbes MDIV, BCC
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
## 2025-07-16 NOTE — CARE COORDINATION
7/16:  Update CM Note:  Pt presented to the Er for dizziness & fatigue from home. Pt is on 2L/NC at 92%, Iv Doxycycline, Iv Rocephin & Sq Lovenox. PT 16/24 OT 15/24.  MRI needed.  Pt's dc plan is still home with family to transport.  Pt is now open to HHC & choice [facility].  [facility] can accept & HHC order placed.  If pt needs DME -no preferences & declined list.  Will need 02 testing.  Sw/CM will continue to follow.  Electronically signed by Sally Carrizales RN on 7/16/2025 at 2:37 PM    **ATTENTION BEDSIDE RN**    Please copy below template, fill in appropriate fields, and place in a progress note.    Testing MUST be completed within but no later than 48 hours of discharge.     Please ambulate patient for a MINIMUM of 6 minutes to ensure accuracy of test.      Pulse ox was _______ % on room air at rest.  Ambulated patient on room air.    Oxygen saturation was _______ % on room air while ambulating. (lowest saturation reached)  Oxygen applied.    Recovery pulse ox was _______% on ____ liters of oxygen while ambulating.

## 2025-07-16 NOTE — PROGRESS NOTES
Physical Therapy  Physical Therapy Treatment Note      Name: Shae Villar  : 1945  MRN: [id number]      Date of Service: 2025    Evaluating PT:  Mary Lopez PT, DPT [id number]    Room #:  6420/6420-A  Diagnosis:  Dizziness [R42]  Hyponatremia [E87.1]  Pneumonia due to infectious agent [J18.9]  Pneumonia of right lower lobe due to infectious organism [J18.9]  Single subsegmental pulmonary embolism without acute cor pulmonale (HCC) [I26.93]  PMHx/PSHx:   has a past medical history of Acute H. pylori gastric ulcer, Hypertension, and Varicose veins.    Procedure/Surgery:  None  Precautions: Falls, continuous pulse ox, alarms    SUBJECTIVE:    Pt lives with  in a 2 story home with 2 stairs to enter and one rail(s).    Bed is on level floor and bath is on level floor.    Pt ambulated with no AD PTA.    Equipment Owned: None  Equipment Needs:  WW    OBJECTIVE:   Initial Evaluation  Date: 7/15/25 Treatment  Date: 25 Short Term/ Long Term   Goals   AM-PAC 6 Clicks     Was pt agreeable to Eval/treatment? Yes  Yes    Does pt have pain? No pain reported None reported    Bed Mobility  Rolling: SBA  Supine to sit: SBA  Sit to supine: NT  Scooting: SBA Rolling: NT  Supine to sit: NT  Sit to supine: NT  Scooting: NT Rolling: Independent  Supine to sit: Independent  Sit to supine: Independent  Scooting: Independent   Transfers Sit to stand: Jenn  Stand to sit: Jenn  Stand pivot: ModA with no AD  Jenn with WW Sit to stand: Jenn  Stand to sit: Jenn  Stand pivot: Jenn no AD Sit to stand: Supervision  Stand to sit: Supervision  Stand pivot: Supervision with WW   Ambulation    25 feet with ModA with no AD    25 x 2 feet with Jenn with WW 25ftx2 with ww SBA >300 feet with Supervision with WW   Stair negotiation: ascended and descended  NT NT 4 steps with 1 rail(s) Supervision    ROM BUE:  Defer to OT  BLE: WFL     Strength BUE:  Defer to OT  BLE:  Grossly 4/5, BLE knee extension 3+/5  Improve 1 MMT
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
## 2025-07-16 NOTE — PROGRESS NOTES
Physician Progress Note      PATIENT:               ASHLEY NAVA  CSN #:                  [id number]  :                       1945  ADMIT DATE:       2025 11:23 AM  DISCH DATE:  RESPONDING  PROVIDER #:        Kailyn Hobson MD          QUERY TEXT:    Pneumonia is documented in the medical record H&P  by Dr. Milanes Marino.   Please clarify any associated diagnoses:    The clinical indicators include:  79 yo F, admitted for PNA, hyponatremia. PMHx HTN.    WBC 15.2, , procalcitonin 1.47 on admission  (labs).    WBC 10.7 on  (labs). Temperature 103.1F, 101.7F on  (flowsheets).    Per H&P  by Dr. Milanes Marino, \"Community-acquired pneumonia...Continue   antibiotic coverage with ceftriaxone and doxycycline.\"    Per CT pulmonary , \"Pulmonary infiltrate in the right lower lobe, likely   representing pneumonia (radiology).    CT pulmonary, CBC, procalcitonin, CRP, oxygen therapy (orders).    1L NS bolus , Rocephin initiated , Doxycycline initiated  (MAR).  Options provided:  -- Sepsis secondary to pneumonia, present on admission.  -- Other - I will add my own diagnosis  -- Disagree - Not applicable / Not valid  -- Disagree - Clinically unable to determine / Unknown  -- Refer to Clinical Documentation Reviewer    PROVIDER RESPONSE TEXT:    This patient has sepsis secondary to pneumonia, which was present on   admission.    Query created by: Darlene Sapp on 2025 1:19 PM      Electronically signed by:  Kailyn Hobson MD 2025 1:21 PM

## 2025-07-16 NOTE — PROGRESS NOTES
[facility]  Nephrology Progress Note    Patient's Name: Shae Villar    History of Present Illness from 7/14 consult note:    \"Shae Villar is a 80 y.o. female with a past medical history of hypertension.  She presented to the ED on 7/13 reportedly for concerns of dizziness.  Vital signs on 7/13 includes temperature 99.1, respirations 18, pulse 87, /58, and she was 93% SpO2.  She later had a temperature noted at 103.1 on 7/14.  Lab data on 7/13 includes sodium 125, BUN 16, creatinine 1.1, calcium 8.6, procalcitonin 1.47, , WBC 15.2 K.  She had a CT head on 7/13 which showed normal ventricles and age-appropriate cerebral cortical atrophy.  CTA pulmonary 7/13 showed small questionable embolus at the bifurcation of the anterior segmental artery of the left upper lobe, pulmonary infiltrate in the right lower lobe, likely representing pneumonia, small right pleural effusion.  Nephrology has been consulted to see the patient for concerns of hyponatremia.  At present, patient was seen and examined.  She reportedly lost consciousness.  She also reportedly was not eating well.  She notes nausea/vomiting for approximately 1 week.\"    Subjective:    7/16/2025: Patient was seen and examined.  She is awake, sitting in chair, notes that she feels stronger.  She reports that her appetite is better.    PMH:    Past Medical History:   Diagnosis Date    Acute H. pylori gastric ulcer     Hypertension     Varicose veins        Past Surgical History:   Procedure Laterality Date    COLONOSCOPY      OTHER SURGICAL HISTORY Right 5/15/15    lower extremity stab phlebectomies    WISDOM TOOTH EXTRACTION         Patient Active Problem List   Diagnosis    Varicose veins with pain    Chest pain    Essential hypertension    Left leg pain    Pneumonia due to infectious agent    Hyponatremia       Diet:    ADULT DIET; Regular; 1500 ml    Meds:     venlafaxine  37.5 mg Oral Nightly    amLODIPine  5 mg Oral Daily    sodium chloride

## 2025-07-17 VITALS
DIASTOLIC BLOOD PRESSURE: 60 MMHG | BODY MASS INDEX: 27.31 KG/M2 | HEIGHT: 62 IN | TEMPERATURE: 98.2 F | HEART RATE: 54 BPM | OXYGEN SATURATION: 94 % | RESPIRATION RATE: 18 BRPM | SYSTOLIC BLOOD PRESSURE: 108 MMHG | WEIGHT: 148.4 LBS

## 2025-07-17 LAB
ANION GAP SERPL CALCULATED.3IONS-SCNC: 11 MMOL/L (ref 7–16)
BASOPHILS # BLD: 0.05 K/UL (ref 0–0.2)
BASOPHILS NFR BLD: 1 % (ref 0–2)
BUN SERPL-MCNC: 20 MG/DL (ref 8–23)
CA-I BLD-SCNC: 1.18 MMOL/L (ref 1.15–1.33)
CALCIUM SERPL-MCNC: 8.5 MG/DL (ref 8.8–10.2)
CHLORIDE SERPL-SCNC: 102 MMOL/L (ref 98–107)
CO2 SERPL-SCNC: 22 MMOL/L (ref 22–29)
CREAT SERPL-MCNC: 0.7 MG/DL (ref 0.5–1)
EOSINOPHIL # BLD: 0.08 K/UL (ref 0.05–0.5)
EOSINOPHILS RELATIVE PERCENT: 1 % (ref 0–6)
ERYTHROCYTE [DISTWIDTH] IN BLOOD BY AUTOMATED COUNT: 13.2 % (ref 11.5–15)
GFR, ESTIMATED: 86 ML/MIN/1.73M2
GLUCOSE SERPL-MCNC: 95 MG/DL (ref 74–99)
HCT VFR BLD AUTO: 33 % (ref 34–48)
HGB BLD-MCNC: 11.5 G/DL (ref 11.5–15.5)
IMM GRANULOCYTES # BLD AUTO: 0.18 K/UL (ref 0–0.58)
IMM GRANULOCYTES NFR BLD: 2 % (ref 0–5)
LYMPHOCYTES NFR BLD: 1.43 K/UL (ref 1.5–4)
LYMPHOCYTES RELATIVE PERCENT: 19 % (ref 20–42)
MAGNESIUM SERPL-MCNC: 2 MG/DL (ref 1.6–2.4)
MCH RBC QN AUTO: 30.8 PG (ref 26–35)
MCHC RBC AUTO-ENTMCNC: 34.8 G/DL (ref 32–34.5)
MCV RBC AUTO: 88.5 FL (ref 80–99.9)
MONOCYTES NFR BLD: 0.87 K/UL (ref 0.1–0.95)
MONOCYTES NFR BLD: 12 % (ref 2–12)
NEUTROPHILS NFR BLD: 65 % (ref 43–80)
NEUTS SEG NFR BLD: 4.93 K/UL (ref 1.8–7.3)
PHOSPHATE SERPL-MCNC: 3.8 MG/DL (ref 2.5–4.5)
PLATELET # BLD AUTO: 385 K/UL (ref 130–450)
PMV BLD AUTO: 11 FL (ref 7–12)
POTASSIUM SERPL-SCNC: 3.5 MMOL/L (ref 3.5–5.1)
RBC # BLD AUTO: 3.73 M/UL (ref 3.5–5.5)
SODIUM SERPL-SCNC: 135 MMOL/L (ref 136–145)
WBC OTHER # BLD: 7.5 K/UL (ref 4.5–11.5)

## 2025-07-17 PROCEDURE — 85025 COMPLETE CBC W/AUTO DIFF WBC: CPT

## 2025-07-17 PROCEDURE — 80048 BASIC METABOLIC PNL TOTAL CA: CPT

## 2025-07-17 PROCEDURE — 6370000000 HC RX 637 (ALT 250 FOR IP): Performed by: STUDENT IN AN ORGANIZED HEALTH CARE EDUCATION/TRAINING PROGRAM

## 2025-07-17 PROCEDURE — 97530 THERAPEUTIC ACTIVITIES: CPT

## 2025-07-17 PROCEDURE — 97535 SELF CARE MNGMENT TRAINING: CPT

## 2025-07-17 PROCEDURE — 2500000003 HC RX 250 WO HCPCS: Performed by: STUDENT IN AN ORGANIZED HEALTH CARE EDUCATION/TRAINING PROGRAM

## 2025-07-17 PROCEDURE — 2580000003 HC RX 258: Performed by: STUDENT IN AN ORGANIZED HEALTH CARE EDUCATION/TRAINING PROGRAM

## 2025-07-17 PROCEDURE — 82330 ASSAY OF CALCIUM: CPT

## 2025-07-17 PROCEDURE — 6360000002 HC RX W HCPCS: Performed by: STUDENT IN AN ORGANIZED HEALTH CARE EDUCATION/TRAINING PROGRAM

## 2025-07-17 PROCEDURE — 99239 HOSP IP/OBS DSCHRG MGMT >30: CPT | Performed by: STUDENT IN AN ORGANIZED HEALTH CARE EDUCATION/TRAINING PROGRAM

## 2025-07-17 PROCEDURE — 84100 ASSAY OF PHOSPHORUS: CPT

## 2025-07-17 PROCEDURE — 36415 COLL VENOUS BLD VENIPUNCTURE: CPT

## 2025-07-17 PROCEDURE — 83735 ASSAY OF MAGNESIUM: CPT

## 2025-07-17 RX ORDER — DOXYCYCLINE HYCLATE 100 MG
100 TABLET ORAL 2 TIMES DAILY
Qty: 6 TABLET | Refills: 0 | Status: SHIPPED | OUTPATIENT
Start: 2025-07-17 | End: 2025-07-20

## 2025-07-17 RX ORDER — PANTOPRAZOLE SODIUM 40 MG/1
40 TABLET, DELAYED RELEASE ORAL
Qty: 90 TABLET | Refills: 1 | Status: SHIPPED | OUTPATIENT
Start: 2025-07-17

## 2025-07-17 RX ORDER — BENZONATATE 100 MG/1
100 CAPSULE ORAL 3 TIMES DAILY PRN
Qty: 21 CAPSULE | Refills: 0 | Status: SHIPPED | OUTPATIENT
Start: 2025-07-17 | End: 2025-07-24

## 2025-07-17 RX ADMIN — ENOXAPARIN SODIUM 70 MG: 100 INJECTION SUBCUTANEOUS at 09:09

## 2025-07-17 RX ADMIN — AMLODIPINE BESYLATE 5 MG: 5 TABLET ORAL at 09:08

## 2025-07-17 RX ADMIN — LEVOTHYROXINE SODIUM 25 MCG: 25 TABLET ORAL at 05:36

## 2025-07-17 RX ADMIN — SODIUM CHLORIDE, PRESERVATIVE FREE 10 ML: 5 INJECTION INTRAVENOUS at 09:09

## 2025-07-17 RX ADMIN — VENLAFAXINE HYDROCHLORIDE 75 MG: 75 CAPSULE, EXTENDED RELEASE ORAL at 09:10

## 2025-07-17 RX ADMIN — DOXYCYCLINE 100 MG: 100 INJECTION, POWDER, LYOPHILIZED, FOR SOLUTION INTRAVENOUS at 03:00

## 2025-07-17 NOTE — PROGRESS NOTES
[facility]  Nephrology Progress Note    Patient's Name: Shae Villar    History of Present Illness from 7/14 consult note:    \"Shae Villar is a 80 y.o. female with a past medical history of hypertension.  She presented to the ED on 7/13 reportedly for concerns of dizziness.  Vital signs on 7/13 includes temperature 99.1, respirations 18, pulse 87, /58, and she was 93% SpO2.  She later had a temperature noted at 103.1 on 7/14.  Lab data on 7/13 includes sodium 125, BUN 16, creatinine 1.1, calcium 8.6, procalcitonin 1.47, , WBC 15.2 K.  She had a CT head on 7/13 which showed normal ventricles and age-appropriate cerebral cortical atrophy.  CTA pulmonary 7/13 showed small questionable embolus at the bifurcation of the anterior segmental artery of the left upper lobe, pulmonary infiltrate in the right lower lobe, likely representing pneumonia, small right pleural effusion.  Nephrology has been consulted to see the patient for concerns of hyponatremia.  At present, patient was seen and examined.  She reportedly lost consciousness.  She also reportedly was not eating well.  She notes nausea/vomiting for approximately 1 week.\"    Subjective:    7/17/2025: Patient was seen and examined.  She reports that she feels better and notes that her appetite is better.  She denies any diarrhea or nausea.  She is for possible discharge today    PMH:    Past Medical History:   Diagnosis Date    Acute H. pylori gastric ulcer     Hypertension     Varicose veins        Past Surgical History:   Procedure Laterality Date    COLONOSCOPY      OTHER SURGICAL HISTORY Right 5/15/15    lower extremity stab phlebectomies    WISDOM TOOTH EXTRACTION         Patient Active Problem List   Diagnosis    Varicose veins with pain    Chest pain    Essential hypertension    Left leg pain    Pneumonia due to infectious agent    Hyponatremia       Diet:    ADULT DIET; Regular; 1500 ml    Meds:     venlafaxine  37.5 mg Oral Nightly

## 2025-07-17 NOTE — CARE COORDINATION
7/17:  Update CM Note:  Pt presented to the Er for dizziness & fatigue from home. Pt is dc today.  Pt's dc plan is still home with family to transport.  Pt is now open to HHC & choice [facility].  [facility] can accept & HHC order placed.  If pt needs DME -no preferences & declined list.  Will need 02 testing.  Pt will need Eliquis on dc.  Free 30 & 31.80 for refill.  Sw/CM will continue to follow.  Electronically signed by Sally Carrizales RN on 7/17/2025 at 12:19 PM

## 2025-07-17 NOTE — PROGRESS NOTES
Physical Therapy  Physical Therapy Treatment Note      Name: Shae Villar  : 1945  MRN: [id number]      Date of Service: 2025    Evaluating PT:  Mary Lopez PT, DPT [id number]    Room #:  6420/6420-A  Diagnosis:  Dizziness [R42]  Hyponatremia [E87.1]  Pneumonia due to infectious agent [J18.9]  Pneumonia of right lower lobe due to infectious organism [J18.9]  Single subsegmental pulmonary embolism without acute cor pulmonale (HCC) [I26.93]  PMHx/PSHx:   has a past medical history of Acute H. pylori gastric ulcer, Hypertension, and Varicose veins.    Procedure/Surgery:  None  Precautions: Falls, continuous pulse ox, alarms    SUBJECTIVE:    Pt lives with  in a 2 story home with 2 stairs to enter and one rail(s).    Bed is on level floor and bath is on level floor.    Pt ambulated with no AD PTA.    Equipment Owned: None  Equipment Needs:  WW    OBJECTIVE:   Initial Evaluation  Date: 7/15/25 Treatment  Date: 25 Short Term/ Long Term   Goals   AM-PAC 6 Clicks     Was pt agreeable to Eval/treatment? Yes  Yes    Does pt have pain? No pain reported No pain reported    Bed Mobility  Rolling: SBA  Supine to sit: SBA  Sit to supine: NT  Scooting: SBA Rolling: NT  Supine to sit: NT  Sit to supine: NT  Scooting: NT Rolling: Independent  Supine to sit: Independent  Sit to supine: Independent  Scooting: Independent   Transfers Sit to stand: Jenn  Stand to sit: Jenn  Stand pivot: ModA with no AD  Jenn with WW Sit to stand: Jenn  Stand to sit: Jenn  Stand pivot: SBA with WW  Jenn no AD Sit to stand: Supervision  Stand to sit: Supervision  Stand pivot: Supervision with WW   Ambulation    25 feet with ModA with no AD    25 x 2 feet with Jenn with WW 40 feet with SBA with WW    25 feet x 2 with Jenn with no AD >300 feet with Supervision with WW   Stair negotiation: ascended and descended  NT NT 4 steps with 1 rail(s) Supervision    ROM BUE:  Defer to OT  BLE: WFL     Strength BUE:  Defer to OT  BLE:

## 2025-07-17 NOTE — DISCHARGE SUMMARY
Hospitalist Discharge Summary    Patient ID: Shae Villar   Patient : 1945  Patient's PCP: Ha Vences MD    Admit Date: 2025   Admitting Physician: Maria Milanes Marino, MD    Discharge Date:  2025   Discharge Physician: Kailyn Hobson MD   Discharge Condition: Stable  Discharge Disposition: Home      Hospital course in brief:  (Please refer to daily progress notes for a comprehensive review of the hospitalization by requesting medical records)    80-year-old lady with past medical history of hypertension, hypothyroidism presented to the hospital with chief complaint of dizziness, weakness, passing out episode, fatigue, nonproductive cough, runny nose, sore throat, fever for 5 days.   Blood work showing WBC 15.2 with increased absolute neutrophil count, sodium 125, chloride 88, glucose 101, BUN 16, creatinine 1.1, GFR 53, calcium 8.6, normal total protein, elevated LFTs, troponin 17. Large proteinuria. UA with leukocyte esterase, negative nitrates, ketones. Urine osmolality 548, urine sodium less than 20.   CT head without contrast with normal ventricles and age-appropriate cerebral cortical atrophy. CT cervical with no fracture or acute osseous abnormality. CTA pulmonary with contrast with small questionable embolus at the bifurcation of the dictated segmental artery of the left upper lobe. Pulmonary infiltrate in the right lower lobe likely representing pneumonia, right pleural effusion. She was admitted and treated for Right lung pneumonia with rocephin and doxycycline. Will discharge on 3 more days of Doxycycline.   PE treated with full dose lovenox and, USG lower extremity negative for blood clot. Started on Eliquis 10 mg PO daily for 7 days followed by 5 mg po BID for atleast 6 months.   For Moderate hypotonic Hyponatremia, nephrology consulted, HCTZ was held will Discontinue on discharge, sodium levels improved during the hospital stay.   Patient also complained of  BILATERAL LOWER EXTREMITIES7/13/2025 5:30 pm TECHNIQUE: Duplex ultrasound using B-mode/gray scaled imaging and Doppler spectral analysis and color flow was obtained of the deep venous structures of the bilateral extremities. COMPARISON: None. HISTORY: ORDERING SYSTEM PROVIDED HISTORY: Questionable left upper lobe PE. r/o DVT TECHNOLOGIST PROVIDED HISTORY: What reading provider will be dictating this exam?->CRC FINDINGS: The visualized veins of the bilateral lower extremities are patent and free of echogenic thrombus. The veins demonstrate good compressibility with normal color flow study and spectral analysis.     No evidence of DVT in either lower extremity.     CTA PULMONARY W CONTRAST  Result Date: 7/13/2025  EXAM: CTA CHEST 07/13/2025 01:44:37 PM TECHNIQUE: CTA of the chest was performed after the administration of intravenous contrast. Multiplanar reformatted images are provided for review. MIP images are provided for review. Automated exposure control, iterative reconstruction, and/or weight based adjustment of the mA/kV was utilized to reduce the radiation dose to as low as reasonably achievable. COMPARISON: None available. CLINICAL HISTORY: Cough, chest pain. FINDINGS: PULMONARY ARTERIES: Small questionable embolus noted at the bifurcation of the anterior segmental artery of the left upper lobe (axial series 309, image 71). The main pulmonary arteries have been normal caliber. MEDIASTINUM: No deviation of the interventricular septum to suggest right heart strain. The heart is mildly enlarged. Mild atherosclerosis in the thoracic aorta which is mildly tortuous but not aneurysmal. LUNGS AND PLEURA: Pulmonary infiltrate in the right lower lobe, likely representing pneumonia. The left lung is clear. The central airway is clear. No pneumothorax. Small right pleural effusion. UPPER ABDOMEN: Limited images of the upper abdomen are unremarkable. SOFT TISSUES AND BONES: No acute bone or soft tissue abnormality.

## 2025-07-17 NOTE — PROGRESS NOTES
CLINICAL PHARMACY NOTE: MEDS TO BEDS    Total # of Prescriptions Filled: 4   The following medications were delivered to the patient:  Eliquis 5mg tabs  Benzonatate 100mg caps  Doxycycline 100mg tabs  Pantoprazole 40mg tabs    Additional Documentation:  To pt's  Robert at the pharmacy

## 2025-07-17 NOTE — PROGRESS NOTES
Discussed discharge instructions and follow up appointments to pt and . Medications have been delivered. All questions answered. Heart monitor removed and returned.

## 2025-07-17 NOTE — PLAN OF CARE
Problem: Safety - Adult  Goal: Free from fall injury  7/16/2025 2059 by Ely Casas RN  Outcome: Progressing  7/16/2025 1103 by Zuleyma Pickett RN  Outcome: Progressing     Problem: Discharge Planning  Goal: Discharge to home or other facility with appropriate resources  7/16/2025 2059 by Ely Casas RN  Outcome: Progressing  7/16/2025 1103 by Zuleyma Pickett RN  Outcome: Progressing     Problem: Respiratory - Adult  Goal: Achieves optimal ventilation and oxygenation  7/16/2025 2059 by Eyl Casas RN  Outcome: Progressing  7/16/2025 1103 by Zuleyma Pickett RN  Outcome: Progressing     Problem: Cardiovascular - Adult  Goal: Maintains optimal cardiac output and hemodynamic stability  7/16/2025 2059 by Ely Casas RN  Outcome: Progressing  7/16/2025 1103 by Zuleyma Pickett RN  Outcome: Progressing     Problem: Skin/Tissue Integrity - Adult  Goal: Skin integrity remains intact  7/16/2025 2059 by Ely Casas RN  Outcome: Progressing  7/16/2025 1103 by Zuleyma Pickett RN  Outcome: Progressing     Problem: Musculoskeletal - Adult  Goal: Return mobility to safest level of function  7/16/2025 2059 by Ely Casas RN  Outcome: Progressing  7/16/2025 1103 by Zuleyma Pickett RN  Outcome: Progressing  Goal: Return ADL status to a safe level of function  7/16/2025 2059 by Ely Casas RN  Outcome: Progressing  7/16/2025 1103 by Zuleyma Pickett RN  Outcome: Progressing     Problem: Gastrointestinal - Adult  Goal: Minimal or absence of nausea and vomiting  7/16/2025 2059 by Ely Casas RN  Outcome: Progressing  7/16/2025 1103 by Zuleyma Pickett RN  Outcome: Progressing  Goal: Maintains or returns to baseline bowel function  7/16/2025 2059 by Ely Casas RN  Outcome: Progressing  7/16/2025 1103 by Zuleyma Pickett RN  Outcome: Progressing  Goal: Maintains adequate nutritional intake  7/16/2025 2059 by Casas, Ely, RN  Outcome: Progressing  7/16/2025 1103 by Zuleyma Pickett,

## 2025-07-17 NOTE — PLAN OF CARE
Problem: Safety - Adult  Goal: Free from fall injury  Outcome: Progressing     Problem: Discharge Planning  Goal: Discharge to home or other facility with appropriate resources  Outcome: Progressing     Problem: Respiratory - Adult  Goal: Achieves optimal ventilation and oxygenation  Outcome: Progressing     Problem: Cardiovascular - Adult  Goal: Maintains optimal cardiac output and hemodynamic stability  Outcome: Progressing     Problem: Skin/Tissue Integrity - Adult  Goal: Skin integrity remains intact  Outcome: Progressing     Problem: Musculoskeletal - Adult  Goal: Return mobility to safest level of function  Outcome: Progressing     Problem: Musculoskeletal - Adult  Goal: Return ADL status to a safe level of function  Outcome: Progressing     Problem: Gastrointestinal - Adult  Goal: Minimal or absence of nausea and vomiting  Outcome: Progressing     Problem: Gastrointestinal - Adult  Goal: Maintains or returns to baseline bowel function  Outcome: Progressing     Problem: Gastrointestinal - Adult  Goal: Maintains adequate nutritional intake  Outcome: Progressing     Problem: Infection - Adult  Goal: Absence of infection at discharge  Outcome: Progressing     Problem: Metabolic/Fluid and Electrolytes - Adult  Goal: Electrolytes maintained within normal limits  Outcome: Progressing     Problem: Hematologic - Adult  Goal: Maintains hematologic stability  Outcome: Progressing     Problem: Chronic Conditions and Co-morbidities  Goal: Patient's chronic conditions and co-morbidity symptoms are monitored and maintained or improved  Outcome: Progressing     Problem: Pain  Goal: Verbalizes/displays adequate comfort level or baseline comfort level  Outcome: Progressing
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
## 2025-07-17 NOTE — PROGRESS NOTES
Physician Progress Note      PATIENT:               ASHLEY NAVA  CSN #:                  [id number]  :                       1945  ADMIT DATE:       2025 11:23 AM  DISCH DATE:  RESPONDING  PROVIDER #:        Kailyn Hobson MD          QUERY TEXT:    Based on your medical judgment, please clarify these findings and document if   any of the following are being evaluated and/or treated:    The clinical indicators include:  79 yo F, admitted for PNA, hyponatremia. PMHx HTN.    Creatinine 1.1 on admission . Creatinine 0.8 of . Creatinine 0.7 on    (labs).    Nephrology consult (orders).    1L NS bolus , NS @75cc/hour - (MAR).  Options provided:  -- Acute kidney injury  -- Other - I will add my own diagnosis  -- Disagree - Not applicable / Not valid  -- Disagree - Clinically unable to determine / Unknown  -- Refer to Clinical Documentation Reviewer    PROVIDER RESPONSE TEXT:    Provider disagreed with this query.    Query created by: Darlene Sapp on 2025 10:30 AM      Electronically signed by:  Kailyn Hobson MD 2025 1:12 PM

## 2025-07-20 LAB
MICROORGANISM SPEC CULT: NORMAL
MICROORGANISM SPEC CULT: NORMAL
SERVICE CMNT-IMP: NORMAL
SERVICE CMNT-IMP: NORMAL
SPECIMEN DESCRIPTION: NORMAL
SPECIMEN DESCRIPTION: NORMAL

## 2025-08-01 LAB
ANION GAP SERPL CALCULATED.3IONS-SCNC: 12 MMOL/L (ref 7–16)
BUN SERPL-MCNC: 12 MG/DL (ref 8–23)
CALCIUM SERPL-MCNC: 9.6 MG/DL (ref 8.8–10.2)
CHLORIDE SERPL-SCNC: 104 MMOL/L (ref 98–107)
CO2 SERPL-SCNC: 25 MMOL/L (ref 22–29)
CREAT SERPL-MCNC: 0.9 MG/DL (ref 0.5–1)
GFR, ESTIMATED: 67 ML/MIN/1.73M2
GLUCOSE SERPL-MCNC: 92 MG/DL (ref 74–99)
POTASSIUM SERPL-SCNC: 4.7 MMOL/L (ref 3.5–5.1)
SODIUM SERPL-SCNC: 141 MMOL/L (ref 136–145)